# Patient Record
Sex: FEMALE | Race: WHITE | Employment: FULL TIME | URBAN - METROPOLITAN AREA
[De-identification: names, ages, dates, MRNs, and addresses within clinical notes are randomized per-mention and may not be internally consistent; named-entity substitution may affect disease eponyms.]

---

## 2017-11-07 LAB
CHLAMYDIA, EXTERNAL: NEGATIVE
HBSAG, EXTERNAL: NEGATIVE
HIV, EXTERNAL: NON REACTIVE
N. GONORRHEA, EXTERNAL: NEGATIVE
RUBELLA, EXTERNAL: NORMAL
TYPE, ABO & RH, EXTERNAL: NORMAL

## 2018-03-27 LAB
ANTIBODY SCREEN, EXTERNAL: NEGATIVE
T. PALLIDUM, EXTERNAL: NEGATIVE

## 2018-05-21 LAB — GRBS, EXTERNAL: NEGATIVE

## 2018-06-05 ENCOUNTER — ANESTHESIA EVENT (OUTPATIENT)
Dept: LABOR AND DELIVERY | Age: 33
End: 2018-06-05
Payer: COMMERCIAL

## 2018-06-05 ENCOUNTER — HOSPITAL ENCOUNTER (INPATIENT)
Age: 33
LOS: 2 days | Discharge: HOME | End: 2018-06-07
Attending: OBSTETRICS & GYNECOLOGY | Admitting: OBSTETRICS & GYNECOLOGY
Payer: COMMERCIAL

## 2018-06-05 ENCOUNTER — ANESTHESIA (OUTPATIENT)
Dept: LABOR AND DELIVERY | Age: 33
End: 2018-06-05
Payer: COMMERCIAL

## 2018-06-05 DIAGNOSIS — R52 POSTPARTUM PAIN: Primary | ICD-10-CM

## 2018-06-05 PROBLEM — Z34.90 PREGNANCY: Status: ACTIVE | Noted: 2018-06-05

## 2018-06-05 LAB
A1 MICROGLOB PLACENTAL VAG QL: POSITIVE
BASOPHILS # BLD: 0.1 K/UL (ref 0–0.1)
BASOPHILS NFR BLD: 0 % (ref 0–1)
CONTROL LINE PRESENT?: NORMAL
DAILY QC (YES/NO)?: YES
DIFFERENTIAL METHOD BLD: ABNORMAL
EOSINOPHIL # BLD: 0.1 K/UL (ref 0–0.4)
EOSINOPHIL NFR BLD: 1 % (ref 0–7)
ERYTHROCYTE [DISTWIDTH] IN BLOOD BY AUTOMATED COUNT: 12.7 % (ref 11.5–14.5)
EXPIRATION DATE: NORMAL
HCT VFR BLD AUTO: 37.3 % (ref 35–47)
HGB BLD-MCNC: 12.7 G/DL (ref 11.5–16)
IMM GRANULOCYTES # BLD: 0.1 K/UL (ref 0–0.04)
IMM GRANULOCYTES NFR BLD AUTO: 1 % (ref 0–0.5)
INTERNAL NEGATIVE CONTROL: NORMAL
KIT LOT NO.: NORMAL
LYMPHOCYTES # BLD: 3 K/UL (ref 0.8–3.5)
LYMPHOCYTES NFR BLD: 27 % (ref 12–49)
MCH RBC QN AUTO: 28.7 PG (ref 26–34)
MCHC RBC AUTO-ENTMCNC: 34 G/DL (ref 30–36.5)
MCV RBC AUTO: 84.4 FL (ref 80–99)
MONOCYTES # BLD: 0.9 K/UL (ref 0–1)
MONOCYTES NFR BLD: 8 % (ref 5–13)
NEUTS SEG # BLD: 7 K/UL (ref 1.8–8)
NEUTS SEG NFR BLD: 63 % (ref 32–75)
NRBC # BLD: 0 K/UL (ref 0–0.01)
NRBC BLD-RTO: 0 PER 100 WBC
PH, VAGINAL FLUID: 7 (ref 5–6.1)
PLATELET # BLD AUTO: 196 K/UL (ref 150–400)
PMV BLD AUTO: 10 FL (ref 8.9–12.9)
RBC # BLD AUTO: 4.42 M/UL (ref 3.8–5.2)
WBC # BLD AUTO: 11.2 K/UL (ref 3.6–11)

## 2018-06-05 PROCEDURE — 36415 COLL VENOUS BLD VENIPUNCTURE: CPT | Performed by: OBSTETRICS & GYNECOLOGY

## 2018-06-05 PROCEDURE — 74011250636 HC RX REV CODE- 250/636: Performed by: OBSTETRICS & GYNECOLOGY

## 2018-06-05 PROCEDURE — 76060000078 HC EPIDURAL ANESTHESIA

## 2018-06-05 PROCEDURE — A4300 CATH IMPL VASC ACCESS PORTAL: HCPCS

## 2018-06-05 PROCEDURE — 00HU33Z INSERTION OF INFUSION DEVICE INTO SPINAL CANAL, PERCUTANEOUS APPROACH: ICD-10-PCS | Performed by: ANESTHESIOLOGY

## 2018-06-05 PROCEDURE — 77030003666 HC NDL SPINAL BD -A

## 2018-06-05 PROCEDURE — 83986 ASSAY PH BODY FLUID NOS: CPT | Performed by: OBSTETRICS & GYNECOLOGY

## 2018-06-05 PROCEDURE — 85025 COMPLETE CBC W/AUTO DIFF WBC: CPT | Performed by: OBSTETRICS & GYNECOLOGY

## 2018-06-05 PROCEDURE — 77030034849

## 2018-06-05 PROCEDURE — 74011000250 HC RX REV CODE- 250

## 2018-06-05 PROCEDURE — 74011250636 HC RX REV CODE- 250/636

## 2018-06-05 PROCEDURE — 75810000275 HC EMERGENCY DEPT VISIT NO LEVEL OF CARE

## 2018-06-05 PROCEDURE — 99283 EMERGENCY DEPT VISIT LOW MDM: CPT

## 2018-06-05 PROCEDURE — 77030014125 HC TY EPDRL BBMI -B: Performed by: ANESTHESIOLOGY

## 2018-06-05 PROCEDURE — 75410000002 HC LABOR FEE PER 1 HR

## 2018-06-05 PROCEDURE — 65410000002 HC RM PRIVATE OB

## 2018-06-05 PROCEDURE — 84112 EVAL AMNIOTIC FLUID PROTEIN: CPT | Performed by: OBSTETRICS & GYNECOLOGY

## 2018-06-05 RX ORDER — FENTANYL/BUPIVACAINE/NS/PF 2-1250MCG
PREFILLED PUMP RESERVOIR EPIDURAL
Status: COMPLETED
Start: 2018-06-05 | End: 2018-06-05

## 2018-06-05 RX ORDER — BUPIVACAINE HYDROCHLORIDE AND EPINEPHRINE 2.5; 5 MG/ML; UG/ML
INJECTION, SOLUTION EPIDURAL; INFILTRATION; INTRACAUDAL; PERINEURAL AS NEEDED
Status: DISCONTINUED | OUTPATIENT
Start: 2018-06-05 | End: 2018-06-06 | Stop reason: HOSPADM

## 2018-06-05 RX ORDER — FENTANYL/BUPIVACAINE/NS/PF 2-1250MCG
1-16 PREFILLED PUMP RESERVOIR EPIDURAL CONTINUOUS
Status: DISCONTINUED | OUTPATIENT
Start: 2018-06-05 | End: 2018-06-06 | Stop reason: HOSPADM

## 2018-06-05 RX ORDER — CALCIUM CARBONATE 200(500)MG
2 TABLET,CHEWABLE ORAL DAILY
COMMUNITY

## 2018-06-05 RX ORDER — OXYTOCIN IN 5 % DEXTROSE 30/500 ML
PLASTIC BAG, INJECTION (ML) INTRAVENOUS
Status: DISCONTINUED
Start: 2018-06-05 | End: 2018-06-05

## 2018-06-05 RX ORDER — SODIUM CHLORIDE 0.9 % (FLUSH) 0.9 %
5-10 SYRINGE (ML) INJECTION AS NEEDED
Status: DISCONTINUED | OUTPATIENT
Start: 2018-06-05 | End: 2018-06-06 | Stop reason: HOSPADM

## 2018-06-05 RX ORDER — BUPIVACAINE HYDROCHLORIDE AND EPINEPHRINE 2.5; 5 MG/ML; UG/ML
INJECTION, SOLUTION EPIDURAL; INFILTRATION; INTRACAUDAL; PERINEURAL
Status: COMPLETED
Start: 2018-06-05 | End: 2018-06-05

## 2018-06-05 RX ORDER — OXYTOCIN IN 5 % DEXTROSE 30/500 ML
1-25 PLASTIC BAG, INJECTION (ML) INTRAVENOUS
Status: DISCONTINUED | OUTPATIENT
Start: 2018-06-05 | End: 2018-06-06 | Stop reason: HOSPADM

## 2018-06-05 RX ORDER — FENTANYL CITRATE 50 UG/ML
INJECTION, SOLUTION INTRAMUSCULAR; INTRAVENOUS AS NEEDED
Status: DISCONTINUED | OUTPATIENT
Start: 2018-06-05 | End: 2018-06-06 | Stop reason: HOSPADM

## 2018-06-05 RX ORDER — SODIUM CHLORIDE, SODIUM LACTATE, POTASSIUM CHLORIDE, CALCIUM CHLORIDE 600; 310; 30; 20 MG/100ML; MG/100ML; MG/100ML; MG/100ML
125 INJECTION, SOLUTION INTRAVENOUS CONTINUOUS
Status: DISCONTINUED | OUTPATIENT
Start: 2018-06-05 | End: 2018-06-07 | Stop reason: HOSPADM

## 2018-06-05 RX ORDER — FENTANYL CITRATE 50 UG/ML
INJECTION, SOLUTION INTRAMUSCULAR; INTRAVENOUS
Status: COMPLETED
Start: 2018-06-05 | End: 2018-06-05

## 2018-06-05 RX ORDER — SODIUM CHLORIDE 0.9 % (FLUSH) 0.9 %
5-10 SYRINGE (ML) INJECTION EVERY 8 HOURS
Status: DISCONTINUED | OUTPATIENT
Start: 2018-06-05 | End: 2018-06-06 | Stop reason: HOSPADM

## 2018-06-05 RX ADMIN — SODIUM CHLORIDE, SODIUM LACTATE, POTASSIUM CHLORIDE, AND CALCIUM CHLORIDE 125 ML/HR: 600; 310; 30; 20 INJECTION, SOLUTION INTRAVENOUS at 18:37

## 2018-06-05 RX ADMIN — Medication 12 ML/HR: at 18:25

## 2018-06-05 RX ADMIN — BUPIVACAINE HYDROCHLORIDE AND EPINEPHRINE 0.8 ML: 2.5; 5 INJECTION, SOLUTION EPIDURAL; INFILTRATION; INTRACAUDAL; PERINEURAL at 18:03

## 2018-06-05 RX ADMIN — BUPIVACAINE HYDROCHLORIDE AND EPINEPHRINE 3 ML: 2.5; 5 INJECTION, SOLUTION EPIDURAL; INFILTRATION; INTRACAUDAL; PERINEURAL at 18:04

## 2018-06-05 RX ADMIN — SODIUM CHLORIDE, SODIUM LACTATE, POTASSIUM CHLORIDE, AND CALCIUM CHLORIDE 125 ML/HR: 600; 310; 30; 20 INJECTION, SOLUTION INTRAVENOUS at 11:12

## 2018-06-05 RX ADMIN — BUPIVACAINE HYDROCHLORIDE AND EPINEPHRINE 3 ML: 2.5; 5 INJECTION, SOLUTION EPIDURAL; INFILTRATION; INTRACAUDAL; PERINEURAL at 18:06

## 2018-06-05 RX ADMIN — Medication 2 MILLI-UNITS/MIN: at 11:11

## 2018-06-05 RX ADMIN — FENTANYL CITRATE 100 MCG: 50 INJECTION, SOLUTION INTRAMUSCULAR; INTRAVENOUS at 18:06

## 2018-06-05 RX ADMIN — SODIUM CHLORIDE, SODIUM LACTATE, POTASSIUM CHLORIDE, AND CALCIUM CHLORIDE 999 ML/HR: 600; 310; 30; 20 INJECTION, SOLUTION INTRAVENOUS at 17:36

## 2018-06-05 RX ADMIN — Medication 14 MILLI-UNITS/MIN: at 15:30

## 2018-06-05 RX ADMIN — Medication 16 MILLI-UNITS/MIN: at 16:00

## 2018-06-05 NOTE — PROGRESS NOTES
0715- pt off monitor to go to walk. Birthing ball given    0805- pt back on monitor    0838- reactive nst, pt off monitor to walk    0940- pt back on monitor    0956- pt off monitior, reactive nst    1057- dr. Elizabeth Zuñiga in. Strip reviewed. sve done. poc to start pitocin at this time. Pt and  in agreement with this plan    1300- pt comfortable during contractions, no needs voiced    1600- pt uncomfortable with contractions, pt declines any pain management at this time    1640- pt requesting dr. Elizabeth Zuñiga to check cervix, dr. Elizabeth Zuñiga called and made aware, will come shortly    46- dr. Elizabeth Zuñiga in. Strip reviewed. sve done. poc to continue with pitocin    1723- pt requesting epidural, fluid bolus started    1800- dr. Skyla Montaño in. Time out done. Pt to sitting position on side of bed    1808- epidural completed. 1835- pt repositioned to far right side with peanut ball    1906- dr. Elizabeth Zuñiga in. Strip reviewed.  sve done    1911- report to [email or web address]

## 2018-06-05 NOTE — H&P
EDC:2018  EGA: 38 weeks, 2 days      Chief Complaint:  leakage of fluid. History of Present Illness:  35 y/o  @ 38w2d presents to L&D c/o leakage of fluid since . Some scant spotting. Denies contractions. Good fetal movement. Pregnancy complicated by low PRAVEENA-A (US surveillance has been reassuring). GS 36w1d: EFW 45% (6-3, 2797), HC 35w1d, AC 36w1d, posterior placenta, STEPHON 11.7      Patient's Prenatal Care with Doctor of Record Shanelle Herrera MD Notable For -    Low PRAVEENA A growth 28 (39%), growth 36 (45%)  Normal pregnancy primigravida, girl, waiting on name   lab screening          Impression & Recommendations:    Problem # 1:  ROM at term (WYA-726.63) (GUG73-T18.02)  PROM 2345  Irregular contractions  GBS negative  Admit for expectant management  Augment prn    Problem # 2:  Low PRAVEENA A growth 28 (39%), growth 36 (45%) (ICD-796.5) (DTS31-E86.1)    Other Orders:  Sent to L&D (CPT-AdmitF)      Past Pregnancy History      :  1     Term Births:  0     Premature Births: 0     Living Children: 0     Para:   0     Prev : 0     Aborta:  0     Elect. Ab:  0     Spont.  Ab:  0     Ectopics:  0    Pregnancy # 1     Comments:  current        Past Medical History:     Reviewed history from 2016 and no changes required:        Dysmenorrhea, improved with OCP    Past Surgical History:     Reviewed history from 2016 and no changes required:        None    Family History Summary:      Reviewed history Last on 2017 and no changes required:2018      General Comments - FH:  Family history transferred to [address] [place] [address] [address] [facility]     Social History:     Reviewed history from 2017 and no changes required:         to Fernandez        both work for [employer]        both went to [employer] [facility] from [facility] moved to [place] 2015        Originally from [address] [place] [address]        See HPI    Except as noted in the HPI, the review of systems is negative for General, Breast, , CV, Resp, Endo, MS, Derm, Neuro, Psych, Allergy and Heme.     Allergies      Medications Removed from Medication List        Flowsheet View for Follow-up Visit     Estimated weeks of        gestation:  38 2/7     Weight:  159.6     FHR:   120     Fetal position:  vertex     Cx Dilation:  0     Cx Effacement: 80%     Cx Station:  0      Vital Signs  Visit Vitals    /78    Pulse 80    Temp 98.1 °F (36.7 °C)    Resp 16    Ht 5' 5\" (1.651 m)    Wt 74.1 kg (163 lb 5.8 oz)    SpO2 98%    BMI 27.18 kg/m2       FHT Descrip:    reactive       - Additional FHT Comments: category 1  Contractions:  yes  UC Frequency:  Irregular    NST:   reactive     Height:   65.5 inches  Weight:  159.6 pounds    BMI:   26.25 inches    Physical Exam     General           General appearance:  no acute distress    Head           Inspection:   normal    Eyes           External:   EOM intact    ENT           Dental:   adequate dentition    Chest           Lungs:  clear to auscultation          Heart:  regular rate and rhythm    Extremeties           Extremeties:  0 edema    Neurological           Reflexes:  2+ and symmetric with no pathological reflexes    Psych           Orientation:  oriented to time, place, and person          Mood:  no appearance of anxiety, depression, or agitation    Lymph           Inguinal:  no inguinal adenopathy    Skin           Inspection:  no rashes, suspicious lesions, or ulcerations    Abdomen           Abdomen:  gravid          EFW:  6.5#    Pelvic Exam           EGBUS:  no lesions          Vagina:  pooling clear fluid - nitrazine & Amnisure positive          Uterus:  gravid          Cervix:  no lesions or discharge                  Dilation: : 0                  Effacement:  80%                  Station:  0                  Presentation:  vertex                  Membranes:  ruptured    [place]: irregular fluid  FHR: 120, category 1        Impression & Recommendations:    Problem # 1:  ROM at term ([facility]-121.04) (FLX54-J68.02)  PROM 2345  Irregular contractions  GBS negative  Admit for expectant management  Augment prn    Problem # 2:  Low PRAVEENA A growth 28 (39%), growth 36 (45%) (ICD-796.5) (HHC50-L19.1)    Other Orders:  Sent to L&D (CPT-AdmitF)      Medications (at conclusion of this visit)    01/02/2018 VITAMIN D3 1000 UNIT ORAL CAPSULE (CHOLECALCIFEROL)   11/07/2017 PNV-DHA CAPSULE (PRENAT W/O J-HA-RVMXBNE-FA-DHA CAPS)           LABORATORY DATA   TEST DATE RESULT   Group B Strep culture 05/21/2018 Negative                                   (Group B Strep Culture Result Field)   Blood Type 11/07/2017 B                                             (Blood Type Result Field)   Rh 11/07/2017 Positive                                   (Rh Result Field)   Rhogam Inj Given     Tdap Vaccine Given 03/27/2018 Vacc. [address]   Antibody Screen 03/27/2018 Negative   Rubella  [facility] Reference Ranges On or After 3/10/14                  <0.90              Non-immune      0.90 - 0.99     Equivocal      >0.99              Immune    [facility] Reference Ranges  Before 3/10/14           <5                 Non-immune             5 - 9               Equivocal            >9                 Immune  [facility] Reference Ranges       < Or = 0.90       Negative             0.91-1.09          Equivocal            > Or = 1.10       Positive   11/07/2017     24.70     TPA (T Pallidum Antibodies) 03/27/2018 Negative   Serology (RPR)     HBsAg 11/07/2017 Negative   HIV 11/07/2017 Non Reactive   Hemoglobin 03/27/2018 12.4   Hematocrit 03/27/2018 37.6   Platelets 08/78/5472 205 X10E3/UL   TSH     Urine Culture 11/07/2017 Negative   GC DNA Probe 11/07/2017 Negative   Chlamydia DNA 11/07/2017 Negative   PAP 01/18/2016 NIL   Flu Vaccine Given 11/07/2017 Vacc.  VWC   HGBA1C     HGB Electro     T4, Free     BG Fasting     GTT 1H 50G 03/27/2018 133   GTT 1H 100G GTT 2H 100G     GTT 3H 100G     Glucose Plasma     CF Accept or Decline 11/07/2017 accepted   CF Screen Result 11/07/2017 Negative   Nuchal Trans 12/05/2017 1.2^1.2 mm&millimeters   AFP Only 01/02/2018 *Screen Negative*   Tetra     AFP Serum     CVS 11/07/2017 declined   AFP Amniotic     Amnio Karyo 11/07/2017 declined   FISH     GC Culture     Chlamydia Cult     Ureaplasma     Mycoplasma     WBC 11/07/2017 7.9 X10E3/UL   RBC 11/07/2017 4.72 X10E6/UL   MCV 11/07/2017 86   MCH 11/07/2017 29.2   MCHC RBC 11/07/2017 33.9     ULTRASOUND DATA   TEST DATE RESULT   Estimated Fetal Weight 05/21/2018 8862.83985600^6345 g&grams                                     Weight % 05/21/2018 45^45% %&percent                                                STEPHON 05/21/2018 11.74^11.7 cm&centimeters                    BPP 05/21/2018 8^8 [n/a]&Not applicable   Cervical Length (mm)             Electronically signed by Nedra Mcdonough MD on 06/05/2018 at 3:22 AM    ________________________________________________________________________  Date of Surgery Update:  Bradley Kaufman was seen and examined. History and physical has been reviewed. The patient has been examined.  There have been no significant clinical changes since the completion of the originally dated History and Physical.    Signed By: Nedra Mcdonough MD     June 5, 2018 3:24 AM

## 2018-06-05 NOTE — ANESTHESIA PROCEDURE NOTES
Epidural Block    Start time: 6/5/2018 5:58 PM  End time: 6/5/2018 6:08 PM  Performed by: Esther Reis by: Laisha Cazares     Pre-Procedure  Indication: labor epidural    Preanesthetic Checklist: risks and benefits discussed, site marked and timeout performed    Timeout Time: 17:58        Epidural:   Patient position:  Seated  Prep region:  Lumbar  Prep: DuraPrep    Location:  L4-5    Needle and Epidural Catheter:   Needle Type:  Tuohy  Needle Gauge:  17 G  Injection Technique:  Loss of resistance using saline  Attempts:  1  Catheter Size:  19 G  Catheter at Skin Depth (cm):  8  Depth in Epidural Space (cm):  4  Events: no blood with aspiration, no cerebrospinal fluid with aspiration, no paresthesia and negative aspiration test    Test Dose:  Bupivacaine 0.25% w/ epi and negative    Assessment:   Catheter Secured:  Tegaderm and tape  Insertion:  Uncomplicated  Patient tolerance:  Patient tolerated the procedure well with no immediate complications  Spinal portion:    A 25 g pencil point spinal needle was placed through the Touhy x1 attempt until CSF was obtained. 0.8 mL 0.25% bupivacaine with 1:200K epinephrine was deposited into the CSF. -paresthesia.

## 2018-06-05 NOTE — IP AVS SNAPSHOT
[address] [facility] 
[phone] Patient: Ishaan Gallego MRN: [id number] SIY:[birth year] A check janine indicates which time of day the medication should be taken. My Medications START taking these medications Instructions Each Dose to Equal  
 Morning Noon Evening Bedtime  
 ibuprofen 800 mg tablet Commonly known as:  MOTRIN Your last dose was: Your next dose is: Take 1 Tab by mouth every eight (8) hours as needed for Pain. 800 mg  
    
   
   
   
  
 miscellaneous medical supply Misc Commonly known as:  BLOOD PRESSURE CUFF Your last dose was: Your next dose is:    
   
   
 1 Each by Does Not Apply route daily. 1 Each  
    
   
   
   
  
 oxyCODONE-acetaminophen 5-325 mg per tablet Commonly known as:  PERCOCET Your last dose was: Your next dose is: Take 1 Tab by mouth every four (4) hours as needed for Pain. Max Daily Amount: 6 Tabs. 1 Tab CONTINUE taking these medications Instructions Each Dose to Equal  
 Morning Noon Evening Bedtime  
 calcium carbonate 200 mg calcium (500 mg) Chew Commonly known as:  TUMS Your last dose was: Your next dose is: Take 2 Tabs by mouth daily. Indications: Heartburn 2 Tab PNV66-Iron Fumarate-FA-DSS-DHA 26-1.2- mg Cap Your last dose was: Your next dose is: Take 1 Tab by mouth. Indications: pregnancy 1 Tab Where to Get Your Medications Information on where to get these meds will be given to you by the nurse or doctor. ! Ask your nurse or doctor about these medications  
  ibuprofen 800 mg tablet  
 miscellaneous medical supply Misc  
 oxyCODONE-acetaminophen 5-325 mg per tablet

## 2018-06-05 NOTE — PROGRESS NOTES
Pt comfortable. Has been ambulating, sitting on ball, etc.  Feeling some pressure but not much in the way of contractions. Visit Vitals    /74    Pulse 69    Temp 98.3 °F (36.8 °C)    Resp 18    Ht 5' 5\" (1.651 m)    Wt 74.1 kg (163 lb 5.8 oz)    SpO2 98%    Breastfeeding Yes    BMI 27.18 kg/m2      Patient Vitals for the past 4 hrs: Mode Fetal Heart Rate Fetal Activity Variability Decelerations Accelerations RN Reviewed Strip?   06/05/18 1200 External 125 Present 6-25 BPM None Yes Yes   06/05/18 1145 External 125 - - - - -   06/05/18 1130 External 120 Present 6-25 BPM None Yes Yes   06/05/18 1113 External 130 Present 6-25 BPM None Yes Yes   06/05/18 0954 External 135 Present 6-25 BPM None Yes Yes   06/05/18 0838 External 125 Present 6-25 BPM None Yes Yes     CTx irregular, mild    Cx 1/80/0    35 yo G1 at 45 2/7 with PROM at 2345 last night. Now nearly 12 hours after rupture without significant labor. Cat 1 tracing  Discussed augmentation with pitocin, and the risks and benefits of this and she agrees. Will begin and titrate to adequacy.

## 2018-06-05 NOTE — PROGRESS NOTES
Pt feeling painful contractions for about an hour or so. Visit Vitals    /62    Pulse 76    Temp 98 °F (36.7 °C)    Resp 18    Ht 5' 5\" (1.651 m)    Wt 74.1 kg (163 lb 5.8 oz)    SpO2 100%    Breastfeeding Yes    BMI 27.18 kg/m2      Patient Vitals for the past 4 hrs: Mode Fetal Heart Rate Fetal Activity Variability Decelerations Accelerations RN Reviewed Strip?   06/05/18 1745 External 120 - - - - -   06/05/18 1727 External 135 Present 6-25 BPM None Yes Yes   06/05/18 1715 External 120 - - - - -   06/05/18 1700 External 135 Present 6-25 BPM None Yes Yes   06/05/18 1645 External 130 - - - - -   06/05/18 1630 External 125 Present 6-25 BPM None Yes Yes   06/05/18 1615 External 125 - - - - -   06/05/18 1600 External 130 Present 6-25 BPM None Yes Yes   06/05/18 1548 External 130 - - - - -   06/05/18 1528 External 125 Present 6-25 BPM None Yes Yes   06/05/18 1515 External 125 - - - - -   06/05/18 1500 External 135 Present 6-25 BPM None Yes Yes   06/05/18 1445 External 135 - - - - -   06/05/18 1433 External 130 Present 6-25 BPM None Yes Yes   06/05/18 1415 External 130 - - - - -      Ctx q 4 minutes    Cx 2/100/0    33 yo G1 at 45 2/7, AOL for PROM  Just recently beginning with painful ctx after initation of pit at aorund 11 am.    Modest cervical change since then.     Cont pit  Cat 1 tracing

## 2018-06-05 NOTE — PROGRESS NOTES
Admitted to labor and delivery with c/o SROM with cl. And bld. Tinged fluid at 2345. Intro. Done. poc explained. [place] to rm. To br to change clothes and void. 0945. Back in bed. efm explained and applied. Assess. Done. 0135. poc explained. Amnisure done. Nitrazine paper positive. 0145. Amnisure positive.    0300. Dr. Verónica Camacho at bedside. View strip. Assess. Done. sve done 0/80%/0/vtx. . L/o    oob to amb    0700. Bedside report given. oob to br to void and amb.

## 2018-06-05 NOTE — IP AVS SNAPSHOT
[facility] 
[phone] Patient: Adeline Rodriguez MRN: [id number] EOD:[birth year] About your hospitalization You were admitted on:  June 5, 2018 You last received care in the:  [facility] 3 [facility] You were discharged on:  June 7, 2018 Why you were hospitalized Your primary diagnosis was:  Not on File Your diagnoses also included:  Pregnancy Follow-up Information Follow up With Details Comments Contact Info Channing Bradley MD   [address] [facility] 
[phone] Discharge Orders None A check janine indicates which time of day the medication should be taken. My Medications START taking these medications Instructions Each Dose to Equal  
 Morning Noon Evening Bedtime  
 ibuprofen 800 mg tablet Commonly known as:  MOTRIN Your last dose was: Your next dose is: Take 1 Tab by mouth every eight (8) hours as needed for Pain. 800 mg  
    
   
   
   
  
 miscellaneous medical supply Misc Commonly known as:  BLOOD PRESSURE CUFF Your last dose was: Your next dose is:    
   
   
 1 Each by Does Not Apply route daily. 1 Each  
    
   
   
   
  
 oxyCODONE-acetaminophen 5-325 mg per tablet Commonly known as:  PERCOCET Your last dose was: Your next dose is: Take 1 Tab by mouth every four (4) hours as needed for Pain. Max Daily Amount: 6 Tabs. 1 Tab CONTINUE taking these medications Instructions Each Dose to Equal  
 Morning Noon Evening Bedtime  
 calcium carbonate 200 mg calcium (500 mg) Chew Commonly known as:  TUMS Your last dose was: Your next dose is: Take 2 Tabs by mouth daily. Indications: Heartburn 2 Tab PNV66-Iron Fumarate-FA-DSS-DHA 26-1.2- mg Cap Your last dose was: Your next dose is: Take 1 Tab by mouth. Indications: pregnancy 1 Tab Where to Get Your Medications Information on where to get these meds will be given to you by the nurse or doctor. ! Ask your nurse or doctor about these medications  
  ibuprofen 800 mg tablet  
 miscellaneous medical supply Misc  
 oxyCODONE-acetaminophen 5-325 mg per tablet Opioid Education Prescription Opioids: What You Need to Know: 
 
 
Diet/Diet Restrictions: 
· Eight 8-ounce glasses of fluid daily (water, juices); avoid excessive caffeine intake. · Meals/snacks as desired which are high in fiber and carbohydrates and low in fat and cholesterol. Physical Activity / Restrictions / Safety: · Avoid heavy lifting, no more that 8 lbs. For 2-3 weeks;  
· Limit use of stairs to 2 times daily for the first week home. · No driving for one week. · Avoid intercourse 4-6 weeks, no douching or tampon use. · Check with obstetrician before starting or resuming an exercise program.   
 
Discharge Instructions/Special Treatment/Home Care Needs:  
 
· Continue prenatal vitamins. · Continue to use squirt bottle with warm water on your episiotomy after each bathroom use until bleeding stops. · If steri-strips applied to your incision, remove in 7-10 days. Call your doctor for the following: · Fever over 101 degrees by mouth. · Vaginal bleeding heavier than a normal menstrual period or clots larger than a golf ball. · Red streaks or increased swelling of legs, painful red streaks on your breast. 
· Painful urination, constipation and increased pain or swelling or discharge with your incision. · If you feel extremely anxious or overwhelmed. · If you have thoughts of harming yourself and/or your baby. Pain Management:  
 
· Take Acetaminophen (Tylenol) or Ibuprofen (Advil, Motrin), as directed for pain. · Use a warm Sitz bath 3 times daily to relieve episiotomy or hemorrhoidal discomfort. · For hemorrhoidal discomfort, use Tucks and Anusol cream as needed and directed. · Heating pad to  incision as needed. Follow-Up Care:  
 
Appointment with MD: Follow-up Appointments Procedures  FOLLOW UP VISIT Appointment in: 6 Weeks Standing Status:   Standing Number of Occurrences:   1 Order Specific Question:   Appointment in Answer:   6 Weeks Telephone number: [phone] Signed By: Gloria Javier MD                                                                                                   Date: 2018 Time: 11:23 AM 
 
Getourguide Activation Thank you for requesting access to Getourguide. Please follow the instructions below to securely access and download your online medical record. Getourguide allows you to send messages to your doctor, view your test results, renew your prescriptions, schedule appointments, and more. How Do I Sign Up? 1. In your internet browser, go to [email or web address] 
2. Click on the First Time User? Click Here link in the Sign In box. You will be redirect to the New Member Sign Up page. 3. Enter your Getourguide Access Code exactly as it appears below. You will not need to use this code after youve completed the sign-up process. If you do not sign up before the expiration date, you must request a new code. Getourguide Access Code: [email or web address] Expires: 9/3/2018  1:10 AM (This is the date your Getourguide access code will ) 4. Enter the last four digits of your Social Security Number (xxxx) and Date of Birth (mm/dd/yyyy) as indicated and click Submit. You will be taken to the next sign-up page. 5. Create a Conyac ID. This will be your Conyac login ID and cannot be changed, so think of one that is secure and easy to remember. 6. Create a Conyac password. You can change your password at any time. 7. Enter your Password Reset Question and Answer. This can be used at a later time if you forget your password. 8. Enter your e-mail address. You will receive e-mail notification when new information is available in [address] 9. Click Sign Up. You can now view and download portions of your medical record. 10. Click the Download Summary menu link to download a portable copy of your medical information. Additional Information If you have questions, please visit the Frequently Asked Questions section of the Conyac website at [email or web address] Remember, Conyac is NOT to be used for urgent needs. For medical emergencies, dial 911. Conyac Announcement We are excited to announce that we are making your provider's discharge notes available to you in Conyac. You will see these notes when they are completed and signed by the physician that discharged you from your recent hospital stay. If you have any questions or concerns about any information you see in Conyac, please call the Health Information Department where you were seen or reach out to your Primary Care Provider for more information about your plan of care. Introducing [facility]! Vilma Staples introduces Conyac patient portal. Now you can access parts of your medical record, email your doctor's office, and request medication refills online. 1. In your internet browser, go to [email or web address] 2. Click on the First Time User? Click Here link in the Sign In box.  You will see the New Member Sign Up page. 3. Enter your [employer] Access Code exactly as it appears below. You will not need to use this code after youve completed the sign-up process. If you do not sign up before the expiration date, you must request a new code. · [employer] Access Code: [email or web address] Expires: 9/3/2018  1:10 AM 
 
4. Enter the last four digits of your Social Security Number (xxxx) and Date of Birth (mm/dd/yyyy) as indicated and click Submit. You will be taken to the next sign-up page. 5. Create a Utility and Environmental Solutionst ID. This will be your [employer] Systems login ID and cannot be changed, so think of one that is secure and easy to remember. 6. Create a [employer] Solution[employer] password. You can change your password at any time. 7. Enter your Password Reset Question and Answer. This can be used at a later time if you forget your password. 8. Enter your e-mail address. You will receive e-mail notification when new information is available in [facility] [address] [facility] [address]. 9. Click Sign Up. You can now view and download portions of your medical record. 10. Click the Download Summary menu link to download a portable copy of your medical information. If you have questions, please visit the Frequently Asked Questions section of the 170 Systems website. Remember, [employer] is NOT to be used for urgent needs. For medical emergencies, dial 911. Now available from your iPhone and Android! Introducing Bradley Cabrera As a [employer] patient, I wanted to make you aware of our electronic visit tool called Bradley Cabrera. [employer] 24/7 allows you to connect within minutes with a medical provider 24 hours a day, seven days a week via a mobile device or tablet or logging into a secure website from your computer. You can access Bradley Cabrera from anywhere in the [place].  
 
A virtual visit might be right for you when you have a simple condition and feel like you just dont want to get out of bed, or cant get away from work for an appointment, when your regular [employer] provider is not available (evenings, weekends or holidays), or when youre out of town and need minor care. Electronic visits cost only $49 and if the [employer] 24/7 provider determines a prescription is needed to treat your condition, one can be electronically transmitted to a nearby pharmacy*. Please take a moment to enroll today if you have not already done so. The enrollment process is free and takes just a few minutes. To enroll, please download the [employer] 24/7 dawson to your tablet or phone, or visit [email or web address] to enroll on your computer. And, as an [address] [place] [address] patient with a [employer] account, the results of your visits will be scanned into your electronic medical record and your primary care provider will be able to view the scanned results. We urge you to continue to see your regular [employer] provider for your ongoing medical care. And while your primary care provider may not be the one available when you seek a Bradley Verdiemkatherinefin virtual visit, the peace of mind you get from getting a real diagnosis real time can be priceless. For more information on Sage Sciencekatherinefin, view our Frequently Asked Questions (FAQs) at [email or web address]. Sincerely, 
 
Linette Ge MD 
Chief Medical Officer [address] *:  certain medications cannot be prescribed via Sage Scienceanurag Providers Seen During Your Hospitalization Provider Specialty Primary office phone July Alexander MD Obstetrics & Gynecology [phone] Your Primary Care Physician (PCP) Primary Care Physician Office Phone Office Fax Kusum Beckford [phone] You are allergic to the following No active allergies Recent Documentation Height Weight Breastfeeding? BMI OB Status Smoking Status 1.651 m 74.1 kg Yes 27.18 kg/m2 Recent pregnancy Never Smoker Emergency Contacts Name Discharge Info Relation Home Work Mobile Morteza Brown DISCHARGE CAREGIVER [3] Spouse [3] [phone] Patient Belongings The following personal items are in your possession at time of discharge: 
  Dental Appliances: None  Visual Aid: None      Home Medications: None   Jewelry: Ring, Earrings, Necklace  Clothing: At bedside, Footwear, Pants, Shirt, Socks, Undergarments    Other Valuables: Cell Phone  Personal Items Sent to Safe: None Please provide this summary of care documentation to your next provider. Signatures-by signing, you are acknowledging that this After Visit Summary has been reviewed with you and you have received a copy. Patient Signature:  ____________________________________________________________ Date:  ____________________________________________________________  
  
[facility] Provider Signature:  ____________________________________________________________ Date:  ____________________________________________________________

## 2018-06-05 NOTE — ANESTHESIA PREPROCEDURE EVALUATION
Anesthetic History   No history of anesthetic complications            Review of Systems / Medical History  Patient summary reviewed, nursing notes reviewed and pertinent labs reviewed    Pulmonary  Within defined limits                 Neuro/Psych   Within defined limits           Cardiovascular  Within defined limits                Exercise tolerance: >4 METS     GI/Hepatic/Renal  Within defined limits              Endo/Other  Within defined limits           Other Findings              Physical Exam    Airway  Mallampati: II  TM Distance: > 6 cm  Neck ROM: normal range of motion   Mouth opening: Normal     Cardiovascular  Regular rate and rhythm,  S1 and S2 normal,  no murmur, click, rub, or gallop             Dental  No notable dental hx       Pulmonary  Breath sounds clear to auscultation               Abdominal  GI exam deferred       Other Findings            Anesthetic Plan    ASA: 1  Anesthesia type: CSE            Anesthetic plan and risks discussed with: Patient

## 2018-06-05 NOTE — IP AVS SNAPSHOT
Summary of Care Report The Summary of Care report has been created to help improve care coordination. Users with access to [facility] or [facility] [address] (Web-based application) may access additional patient information including the Discharge Summary. If you are not currently a [address] user and need more information, please call the number listed below in the [place] [address] section and ask to be connected with Medical Records. Facility Information Name Address Phone [address] [address] [phone] Patient Information Patient Name Sex TIM Crowley ([id number]) Female 1985 Discharge Information Admitting Provider Service Area Unit Annita Hagen MD / [address] [facility] 3 Mother Infant / [phone] Discharge Provider Discharge Date/Time Discharge Disposition Destination (none) 2018 (Pending) AHR (none) Patient Language Language ENGLISH [13] Hospital Problems as of 2018  Reviewed: 2018  5:56 PM by Mayo Luciano MD  
  
  
  
 Class Noted - Resolved Last Modified POA Active Problems Pregnancy  2018 - Present 2018 by Annita Hagen MD Unknown Entered by Annita Hagen MD  
  
Non-Hospital Problems as of 2018  Reviewed: 2018  5:56 PM by Mayo Luciano MD  
 None You are allergic to the following No active allergies Current Discharge Medication List  
  
START taking these medications Dose & Instructions Dispensing Information Comments  
 ibuprofen 800 mg tablet Commonly known as:  MOTRIN Dose:  800 mg Take 1 Tab by mouth every eight (8) hours as needed for Pain. Quantity:  30 Tab Refills:  0  
   
 miscellaneous medical supply Misc Commonly known as:  BLOOD PRESSURE CUFF Dose:  1 Each 1 Each by Does Not Apply route daily. Quantity:  1 Each Refills:  0  
   
 oxyCODONE-acetaminophen 5-325 mg per tablet Commonly known as:  PERCOCET Dose:  1 Tab Take 1 Tab by mouth every four (4) hours as needed for Pain. Max Daily Amount: 6 Tabs. Quantity:  6 Tab Refills:  0 CONTINUE these medications which have NOT CHANGED Dose & Instructions Dispensing Information Comments  
 calcium carbonate 200 mg calcium (500 mg) Chew Commonly known as:  TUMS Dose:  2 Tab Take 2 Tabs by mouth daily. Indications: Heartburn Refills:  0 PNV66-Iron Fumarate-FA-DSS-DHA 26-1.2- mg Cap Dose:  1 Tab Take 1 Tab by mouth. Indications: pregnancy Refills:  0 Surgery Information ID Date/Time Status Primary Surgeon All Procedures Location [id number] 6/5/2018 Complete   ZZANESTHESIA [facility] - DO NOT SCHEDULE Follow-up Information Follow up With Details Comments Contact Info Sindy Toribio MD   [address] [facility]. [phone] Discharge Instructions POST DELIVERY DISCHARGE INSTRUCTIONS Name: Lee Ann Connolly YOB: 1985 Primary Diagnosis: Active Problems: 
  Pregnancy (6/5/2018) General:  
 
Diet/Diet Restrictions: 
· Eight 8-ounce glasses of fluid daily (water, juices); avoid excessive caffeine intake. · Meals/snacks as desired which are high in fiber and carbohydrates and low in fat and cholesterol. Physical Activity / Restrictions / Safety: · Avoid heavy lifting, no more that 8 lbs. For 2-3 weeks;  
· Limit use of stairs to 2 times daily for the first week home. · No driving for one week. · Avoid intercourse 4-6 weeks, no douching or tampon use. · Check with obstetrician before starting or resuming an exercise program.   
 
Discharge Instructions/Special Treatment/Home Care Needs:  
 
· Continue prenatal vitamins. · Continue to use squirt bottle with warm water on your episiotomy after each bathroom use until bleeding stops. · If steri-strips applied to your incision, remove in 7-10 days. Call your doctor for the following: · Fever over 101 degrees by mouth. · Vaginal bleeding heavier than a normal menstrual period or clots larger than a golf ball. · Red streaks or increased swelling of legs, painful red streaks on your breast. 
· Painful urination, constipation and increased pain or swelling or discharge with your incision. · If you feel extremely anxious or overwhelmed. · If you have thoughts of harming yourself and/or your baby. Pain Management:  
 
· Take Acetaminophen (Tylenol) or Ibuprofen (Advil, Motrin), as directed for pain. · Use a warm Sitz bath 3 times daily to relieve episiotomy or hemorrhoidal discomfort. · For hemorrhoidal discomfort, use Tucks and Anusol cream as needed and directed. · Heating pad to  incision as needed. Follow-Up Care:  
 
Appointment with MD: Follow-up Appointments Procedures  FOLLOW UP VISIT Appointment in: 6 Weeks Standing Status:   Standing Number of Occurrences:   1 Order Specific Question:   Appointment in Answer:   6 Weeks Telephone number: [phone] Signed By: Raeann Houston MD                                                                                                   Date: 2018 Time: 11:23 AM 
 
PriceShoppers.com Activation Thank you for requesting access to PriceShoppers.[email or web address]. Please follow the instructions below to securely access and download your online medical record. PriceShoppers.com allows you to send messages to your doctor, view your test results, renew your prescriptions, schedule appointments, and more. How Do I Sign Up? 1. In your internet browser, go to [email or web address] 
2. Click on the First Time User? Click Here link in the Sign In box. You will be redirect to the New Member Sign Up page. 3. Enter your DermaGen Access Code exactly as it appears below. You will not need to use this code after youve completed the sign-up process. If you do not sign up before the expiration date, you must request a new code. DermaGen Access Code: [email or web address] Expires: 9/3/2018  1:10 AM (This is the date your DermaGen access code will ) 4. Enter the last four digits of your Social Security Number (xxxx) and Date of Birth (mm/dd/yyyy) as indicated and click Submit. You will be taken to the next sign-up page. 5. Create a "[employer]t ID. This will be your DermaGen login ID and cannot be changed, so think of one that is secure and easy to remember. 6. Create a DermaGen password. You can change your password at any time. 7. Enter your Password Reset Question and Answer. This can be used at a later time if you forget your password. 8. Enter your e-mail address. You will receive e-mail notification when new information is available in [address] 9. Click Sign Up. You can now view and download portions of your medical record. 10. Click the Download Summary menu link to download a portable copy of your medical information. Additional Information If you have questions, please visit the Frequently Asked Questions section of the DermaGen website at [email or web address] Remember, DermaGen is NOT to be used for urgent needs. For medical emergencies, dial 911. Chart Review Routing History No Routing History on File

## 2018-06-06 PROCEDURE — 0KQM0ZZ REPAIR PERINEUM MUSCLE, OPEN APPROACH: ICD-10-PCS | Performed by: OBSTETRICS & GYNECOLOGY

## 2018-06-06 PROCEDURE — 75410000003 HC RECOV DEL/VAG/CSECN EA 0.5 HR

## 2018-06-06 PROCEDURE — 65410000002 HC RM PRIVATE OB

## 2018-06-06 PROCEDURE — 74011250637 HC RX REV CODE- 250/637: Performed by: OBSTETRICS & GYNECOLOGY

## 2018-06-06 PROCEDURE — 77030031139 HC SUT VCRL2 J&J -A

## 2018-06-06 PROCEDURE — 75410000000 HC DELIVERY VAGINAL/SINGLE

## 2018-06-06 PROCEDURE — 75410000002 HC LABOR FEE PER 1 HR

## 2018-06-06 RX ORDER — ZOLPIDEM TARTRATE 5 MG/1
5 TABLET ORAL
Status: DISCONTINUED | OUTPATIENT
Start: 2018-06-06 | End: 2018-06-07 | Stop reason: HOSPADM

## 2018-06-06 RX ORDER — HYDROCODONE BITARTRATE AND ACETAMINOPHEN 5; 325 MG/1; MG/1
1 TABLET ORAL
Status: DISCONTINUED | OUTPATIENT
Start: 2018-06-06 | End: 2018-06-07 | Stop reason: HOSPADM

## 2018-06-06 RX ORDER — NALOXONE HYDROCHLORIDE 0.4 MG/ML
0.4 INJECTION, SOLUTION INTRAMUSCULAR; INTRAVENOUS; SUBCUTANEOUS AS NEEDED
Status: DISCONTINUED | OUTPATIENT
Start: 2018-06-06 | End: 2018-06-07 | Stop reason: HOSPADM

## 2018-06-06 RX ORDER — HYDROMORPHONE HYDROCHLORIDE 2 MG/1
2 TABLET ORAL
Status: DISCONTINUED | OUTPATIENT
Start: 2018-06-06 | End: 2018-06-07 | Stop reason: HOSPADM

## 2018-06-06 RX ORDER — OXYTOCIN/RINGER'S LACTATE 20/1000 ML
125-500 PLASTIC BAG, INJECTION (ML) INTRAVENOUS ONCE
Status: ACTIVE | OUTPATIENT
Start: 2018-06-06 | End: 2018-06-06

## 2018-06-06 RX ORDER — HYDROCORTISONE ACETATE PRAMOXINE HCL 2.5; 1 G/100G; G/100G
CREAM TOPICAL AS NEEDED
Status: DISCONTINUED | OUTPATIENT
Start: 2018-06-06 | End: 2018-06-07 | Stop reason: HOSPADM

## 2018-06-06 RX ORDER — ACETAMINOPHEN 325 MG/1
650 TABLET ORAL
Status: DISCONTINUED | OUTPATIENT
Start: 2018-06-06 | End: 2018-06-07 | Stop reason: HOSPADM

## 2018-06-06 RX ORDER — SWAB
1 SWAB, NON-MEDICATED MISCELLANEOUS DAILY
Status: DISCONTINUED | OUTPATIENT
Start: 2018-06-06 | End: 2018-06-07 | Stop reason: HOSPADM

## 2018-06-06 RX ORDER — IBUPROFEN 400 MG/1
800 TABLET ORAL EVERY 8 HOURS
Status: DISCONTINUED | OUTPATIENT
Start: 2018-06-06 | End: 2018-06-07 | Stop reason: HOSPADM

## 2018-06-06 RX ADMIN — IBUPROFEN 800 MG: 400 TABLET ORAL at 17:07

## 2018-06-06 RX ADMIN — Medication 1 TABLET: at 09:36

## 2018-06-06 RX ADMIN — IBUPROFEN 800 MG: 400 TABLET ORAL at 09:19

## 2018-06-06 NOTE — PROGRESS NOTES
Assumed care. Intro. Done. poc explained. Resting quietly in bed. [place] in [address] [facility]. Dr. Paula Marley at bedside. Assess. Done. poc explained. No new order at this time. 2105. Dr. Paula Marley here. View strip. L/o to recheck pt at 0100 except if c/o rectal or perineal pressure.

## 2018-06-06 NOTE — PROGRESS NOTES
TRANSFER - IN REPORT:    Verbal report received from Darek King RN(name) on Lee Ann Connolly  being received from L&D(unit) for routine progression of care      Report consisted of patients Situation, Background, Assessment and   Recommendations(SBAR). Information from the following report(s) SBAR, Procedure Summary, Intake/Output, MAR and Recent Results was reviewed with the receiving nurse. Opportunity for questions and clarification was provided. Assessment completed upon patients arrival to unit and care assumed.

## 2018-06-06 NOTE — ROUTINE PROCESS
Bedside shift change report given to KEN Andujar, RN (oncoming nurse) by MONIKA Felipe RNC (offgoing nurse). Report included the following information SBAR, Procedure Summary, Intake/Output, MAR and Recent Results.

## 2018-06-06 NOTE — L&D DELIVERY NOTE
Patient: Lasha Davis  MRN: [id number]   Delivery Note    Obstetrician:  Lewis Butt MD    Pre-Delivery Diagnosis:   PROM at 38 weeks    Delivery: Patient admitted status post PROM of clear fluid. Progressed under pitocin augmentation and epidural analgesia to C/C/+2 and pushed for approximately 15 min with  over second degree laceration of liveborn female infant, apgars 9/9, weight 3265gm. Head delivered direct OA. Anterior (left) shoulder delivered spontaneously with posterior shoulder and body following easily. Spontaneous cry without stimulation. Infant placed on maternal abdomen. Delayed cord clamping x 1 min. Cord clamped x 2 and cut by FOB. Placenta delivered spontaneously intact with 3V cord. 2nd degree lac repaired in standard fashion with good reapproximation and hemostasis noted. No other vaginal or cervical lacerations noted. Fundus firm at U-1.  ml. Mother and baby doing well bonding in LDR. Amniotic Fluid Volume : Moderate clear     Delivery Complications:  None    Infant Details:  Information for the patient's :  Cori Hargrove [[id number]]   One Minute Apgar: 9 (Filed from Delivery Summary)  Five  Minute Apgar: 9 (Filed from Delivery Summary)  Weight 3265 gm    Placenta:  Normal, intact, 3V, discarded        Episiotomy/Laceration(s):  Second degree          Episiotomy/Laceration(s) Repair: Yes, repaired with 2-0 Vicryl.     Group Beta Strep:   Lab Results   Component Value Date/Time    GrBStrep, External Negative 2018               Signed By:  Lewis Butt MD     2018

## 2018-06-06 NOTE — LACTATION NOTE
This note was copied from a baby's chart. Ms Murray Thompson is seen today for lactation consultation. P1 eager to learn mother enjoying her . 10 hours of life/breast fed x 3  One stool, due to void. Reviewed basics of breastfeeding through the first week of life. Assisted baby in cross cradle hold/active drinking x 10 minutes during visit. Questions answered. Has her PIS advanced pump for prn uses/reviewed. Breasts may become engorged when milk \"comes in\". How milk is made / normal phases of milk production, supply and demand discussed. Taught care of engorged breasts - frequent breastfeeding encouraged, warm compresses and breast massage ac. Then nurse the baby or pump. Apply cold compresses pc x 15 minutes a few times a day for swelling or discomfort. May need to do this care for a couple of days.   Outpatient pediatrics with PAR/INC NNP and IBCLC's  Expect success.

## 2018-06-07 VITALS
WEIGHT: 163.36 LBS | BODY MASS INDEX: 27.22 KG/M2 | RESPIRATION RATE: 16 BRPM | DIASTOLIC BLOOD PRESSURE: 83 MMHG | OXYGEN SATURATION: 99 % | TEMPERATURE: 97.8 F | HEIGHT: 65 IN | HEART RATE: 70 BPM | SYSTOLIC BLOOD PRESSURE: 123 MMHG

## 2018-06-07 PROCEDURE — 74011250637 HC RX REV CODE- 250/637: Performed by: OBSTETRICS & GYNECOLOGY

## 2018-06-07 RX ORDER — IBUPROFEN 800 MG/1
800 TABLET ORAL
Qty: 30 TAB | Refills: 0 | Status: SHIPPED | OUTPATIENT
Start: 2018-06-07 | End: 2020-05-25

## 2018-06-07 RX ORDER — OXYCODONE AND ACETAMINOPHEN 5; 325 MG/1; MG/1
1 TABLET ORAL
Qty: 6 TAB | Refills: 0 | Status: SHIPPED | OUTPATIENT
Start: 2018-06-07 | End: 2020-05-25

## 2018-06-07 RX ADMIN — IBUPROFEN 800 MG: 400 TABLET ORAL at 00:39

## 2018-06-07 RX ADMIN — IBUPROFEN 800 MG: 400 TABLET ORAL at 09:18

## 2018-06-07 NOTE — PROGRESS NOTES
Post-Partum Day Number 1 Progress Note    Vicente Settler     Assessment: Doing well, post partum day 1  Mild range bp this am, repeat 130/95. Asx. Plan to check bps at home, one week bp check. Preeclampsia signs/sxs reviewed. Plan:  1. Continue routine postpartum and perineal care as well as maternal education. Information for the patient's :  Dhara Gorman [[id number]]   Vaginal, Spontaneous Delivery   Patient doing well without significant complaint. Voiding without difficulty, normal lochia. Vitals:  Visit Vitals    /90 (BP 1 Location: Right arm, BP Patient Position: At rest)    Pulse 83    Temp 97.8 °F (36.6 °C)    Resp 16    Ht 5' 5\" (1.651 m)    Wt 74.1 kg (163 lb 5.8 oz)    SpO2 99%    Breastfeeding Yes    BMI 27.18 kg/m2     Temp (24hrs), Av.9 °F (36.6 °C), Min:97.8 °F (36.6 °C), Max:98.1 °F (36.7 °C)        Exam:   Patient without distress. Abdomen soft, fundus firm, nontender                Perineum with normal lochia noted. Lower extremities are negative for swelling, cords or tenderness. Labs:     Lab Results   Component Value Date/Time    WBC 11.2 (H) 2018 02:31 AM    HGB 12.7 2018 02:31 AM    HCT 37.3 2018 02:31 AM    PLATELET 105  02:31 AM       No results found for this or any previous visit (from the past 24 hour(s)).

## 2018-06-07 NOTE — DISCHARGE INSTRUCTIONS
POST DELIVERY DISCHARGE INSTRUCTIONS    Name: Blanca Segal  YOB: 1985  Primary Diagnosis: Active Problems:    Pregnancy (2018)        General:     Diet/Diet Restrictions:  · Eight 8-ounce glasses of fluid daily (water, juices); avoid excessive caffeine intake. · Meals/snacks as desired which are high in fiber and carbohydrates and low in fat and cholesterol. Physical Activity / Restrictions / Safety:     · Avoid heavy lifting, no more that 8 lbs. For 2-3 weeks;   · Limit use of stairs to 2 times daily for the first week home. · No driving for one week. · Avoid intercourse 4-6 weeks, no douching or tampon use. · Check with obstetrician before starting or resuming an exercise program.      Discharge Instructions/Special Treatment/Home Care Needs:     · Continue prenatal vitamins. · Continue to use squirt bottle with warm water on your episiotomy after each bathroom use until bleeding stops. · If steri-strips applied to your incision, remove in 7-10 days. Call your doctor for the following:     · Fever over 101 degrees by mouth. · Vaginal bleeding heavier than a normal menstrual period or clots larger than a golf ball. · Red streaks or increased swelling of legs, painful red streaks on your breast.  · Painful urination, constipation and increased pain or swelling or discharge with your incision. · If you feel extremely anxious or overwhelmed. · If you have thoughts of harming yourself and/or your baby. Pain Management:     · Take Acetaminophen (Tylenol) or Ibuprofen (Advil, Motrin), as directed for pain. · Use a warm Sitz bath 3 times daily to relieve episiotomy or hemorrhoidal discomfort. · For hemorrhoidal discomfort, use Tucks and Anusol cream as needed and directed. · Heating pad to  incision as needed.      Follow-Up Care:     Appointment with MD:   Follow-up Appointments   Procedures    FOLLOW UP VISIT Appointment in: 6 Weeks     Standing Status: Standing     Number of Occurrences:   1     Order Specific Question:   Appointment in     Answer:   6 Weeks     Telephone number: [phone]    Signed By: Alyssa Chen MD                                                                                                   Date: 2018 Time: 11:23 AM    MyChart Activation    Thank you for requesting access to [employer]. Please follow the instructions below to securely access and download your online medical record. [employer] allows you to send messages to your doctor, view your test results, renew your prescriptions, schedule appointments, and more. How Do I Sign Up? 1. In your internet browser, go to [email or web address]  2. Click on the First Time User? Click Here link in the Sign In box. You will be redirect to the New Member Sign Up page. 3. Enter your [employer] Access Code exactly as it appears below. You will not need to use this code after youve completed the sign-up process. If you do not sign up before the expiration date, you must request a new code. [employer] Access Code: [email or web address]  Expires: 9/3/2018  1:10 AM (This is the date your [employer] access code will )    4. Enter the last four digits of your Social Security Number (xxxx) and Date of Birth (mm/dd/yyyy) as indicated and click Submit. You will be taken to the next sign-up page. 5. Create a [employer] ID. This will be your [employer] login ID and cannot be changed, so think of one that is secure and easy to remember. 6. Create a [employer] password. You can change your password at any time. 7. Enter your Password Reset Question and Answer. This can be used at a later time if you forget your password. 8. Enter your e-mail address. You will receive e-mail notification when new information is available in [address] 9. Click Sign Up. You can now view and download portions of your medical record.   10. Click the Download Summary menu link to download a portable copy of your medical information. Additional Information    If you have questions, please visit the Frequently Asked Questions section of the [employer] website at [email or web address] Remember, [employer] is NOT to be used for urgent needs. For medical emergencies, dial 911.

## 2018-06-07 NOTE — LACTATION NOTE
This note was copied from a baby's chart. 31 hours of life  Am wt assessed at -5.6%  11 baby led breastfeeding sessions with latch of 9-8 observed and recorded. Nipples intact/lanolin used. 2 wet 4 stools. Reviewed daily I/0 expectations/feeding variances in newborns. Rest/hydrate and nutrition for mother. Enjoying . Expect success.  [facility] # provided. Call prn.

## 2018-06-07 NOTE — DISCHARGE SUMMARY
Obstetrical Discharge Summary     Name: Irais Booth MRN: [id number]  SSN: [id number]    YOB: 1985  Age: 34 y.o. Sex: female      Admit Date: 2018    Discharge Date: 2018     Admitting Physician: Grant Donaldson MD     Attending Physician:  Jacinto Rain MD     Admission Diagnoses: Pregnancy  Pregnancy    Procedure Performed:      Discharge Diagnoses:   Information for the patient's :  Niles Cartwright [[id number]]   Delivery of a 3.265 kg female infant via Vaginal, Spontaneous Delivery on 2018 at 12:58 AM  by . Apgars were 9 and 9. Additional Diagnoses:   Hospital Problems  Date Reviewed: 2018          Codes Class Noted POA    Pregnancy ICD-10-CM: Z34.90  ICD-9-CM: V22.2  2018 Unknown             Lab Results   Component Value Date/Time    Rubella, External Immune 2017    GrBStrep, External Negative 2018       Hospital Course: Presented at 38+weeks with SROM. Closed cervix. Pitocin augmentation started after about 12 hours of no labor. >24 hours of rupture went on to , 2nd degree lac. Post partum course with mild range bp on PPD1. dc'ed to home with plan for one week bp check. Patient Disposition: Home      Followup Care:  Discharge Condition: good  No sex for 6 weeks and No driving while on analgesics  Regular Diet  Keep wound clean and dry    Patient Instructions:   Current Discharge Medication List      START taking these medications    Details   ibuprofen (MOTRIN) 800 mg tablet Take 1 Tab by mouth every eight (8) hours as needed for Pain. Qty: 30 Tab, Refills: 0      oxyCODONE-acetaminophen (PERCOCET) 5-325 mg per tablet Take 1 Tab by mouth every four (4) hours as needed for Pain. Max Daily Amount: 6 Tabs. Qty: 6 Tab, Refills: 0    Associated Diagnoses: Postpartum pain      miscellaneous medical supply (BLOOD PRESSURE CUFF) misc 1 Each by Does Not Apply route daily.   Qty: 1 Each, Refills: 0         CONTINUE these medications which have NOT CHANGED    Details   PNV66-Iron Fumarate-FA-DSS-DHA 26-1.2- mg cap Take 1 Tab by mouth. Indications: pregnancy      calcium carbonate (TUMS) 200 mg calcium (500 mg) chew Take 2 Tabs by mouth daily. Indications: Heartburn             Reference my discharge instructions.     Follow-up Appointments   Procedures    FOLLOW UP VISIT Appointment in: 6 Weeks     Standing Status:   Standing     Number of Occurrences:   1     Order Specific Question:   Appointment in     Answer:   6 Weeks        Signed By:  Gloria Javier MD     June 7, 2018

## 2018-06-07 NOTE — PROGRESS NOTES
Bedside and Verbal shift change report given to Clark Fuller RN (oncoming nurse) by SANKET Buckner (offgoing nurse). Report included the following information SBAR, Kardex, OR Summary, Procedure Summary, Intake/Output, MAR and Recent Results.

## 2019-10-18 LAB
ANTIBODY SCREEN, EXTERNAL: NEGATIVE
CHLAMYDIA, EXTERNAL: NEGATIVE
HBSAG, EXTERNAL: NEGATIVE
HIV, EXTERNAL: NORMAL
N. GONORRHEA, EXTERNAL: NEGATIVE
RUBELLA, EXTERNAL: NORMAL
T. PALLIDUM, EXTERNAL: NEGATIVE
TYPE, ABO & RH, EXTERNAL: NORMAL

## 2020-05-05 LAB — GRBS, EXTERNAL: NEGATIVE

## 2020-05-20 ENCOUNTER — HOSPITAL ENCOUNTER (OUTPATIENT)
Dept: LAB | Age: 35
Discharge: HOME | End: 2020-05-20
Payer: COMMERCIAL

## 2020-05-20 DIAGNOSIS — U07.1 COVID-19: ICD-10-CM

## 2020-05-20 PROCEDURE — 87635 SARS-COV-2 COVID-19 AMP PRB: CPT

## 2020-05-21 LAB — SARS-COV-2, COV2NT: NOT DETECTED

## 2020-05-24 ENCOUNTER — ANESTHESIA EVENT (OUTPATIENT)
Dept: LABOR AND DELIVERY | Age: 35
End: 2020-05-24
Payer: COMMERCIAL

## 2020-05-24 ENCOUNTER — HOSPITAL ENCOUNTER (INPATIENT)
Age: 35
LOS: 1 days | Discharge: HOME | End: 2020-05-25
Attending: OBSTETRICS & GYNECOLOGY | Admitting: OBSTETRICS & GYNECOLOGY
Payer: COMMERCIAL

## 2020-05-24 ENCOUNTER — ANESTHESIA (OUTPATIENT)
Dept: LABOR AND DELIVERY | Age: 35
End: 2020-05-24
Payer: COMMERCIAL

## 2020-05-24 PROBLEM — O47.9 IRREGULAR LABOR: Status: ACTIVE | Noted: 2020-05-24

## 2020-05-24 LAB
ABO + RH BLD: NORMAL
BASOPHILS # BLD: 0.1 K/UL (ref 0–0.1)
BASOPHILS NFR BLD: 0 % (ref 0–1)
BLOOD GROUP ANTIBODIES SERPL: NORMAL
DIFFERENTIAL METHOD BLD: ABNORMAL
EOSINOPHIL # BLD: 0.1 K/UL (ref 0–0.4)
EOSINOPHIL NFR BLD: 1 % (ref 0–7)
ERYTHROCYTE [DISTWIDTH] IN BLOOD BY AUTOMATED COUNT: 13.2 % (ref 11.5–14.5)
HCT VFR BLD AUTO: 39 % (ref 35–47)
HGB BLD-MCNC: 13.1 G/DL (ref 11.5–16)
IMM GRANULOCYTES # BLD AUTO: 0.2 K/UL (ref 0–0.04)
IMM GRANULOCYTES NFR BLD AUTO: 1 % (ref 0–0.5)
LYMPHOCYTES # BLD: 3.3 K/UL (ref 0.8–3.5)
LYMPHOCYTES NFR BLD: 28 % (ref 12–49)
MCH RBC QN AUTO: 28.4 PG (ref 26–34)
MCHC RBC AUTO-ENTMCNC: 33.6 G/DL (ref 30–36.5)
MCV RBC AUTO: 84.4 FL (ref 80–99)
MONOCYTES # BLD: 0.8 K/UL (ref 0–1)
MONOCYTES NFR BLD: 7 % (ref 5–13)
NEUTS SEG # BLD: 7.2 K/UL (ref 1.8–8)
NEUTS SEG NFR BLD: 63 % (ref 32–75)
NRBC # BLD: 0 K/UL (ref 0–0.01)
NRBC BLD-RTO: 0 PER 100 WBC
PLATELET # BLD AUTO: 200 K/UL (ref 150–400)
PMV BLD AUTO: 9.8 FL (ref 8.9–12.9)
RBC # BLD AUTO: 4.62 M/UL (ref 3.8–5.2)
SPECIMEN EXP DATE BLD: NORMAL
WBC # BLD AUTO: 11.6 K/UL (ref 3.6–11)

## 2020-05-24 PROCEDURE — 74011000250 HC RX REV CODE- 250: Performed by: ANESTHESIOLOGY

## 2020-05-24 PROCEDURE — 0KQM0ZZ REPAIR PERINEUM MUSCLE, OPEN APPROACH: ICD-10-PCS | Performed by: OBSTETRICS & GYNECOLOGY

## 2020-05-24 PROCEDURE — 65410000002 HC RM PRIVATE OB

## 2020-05-24 PROCEDURE — 74011250636 HC RX REV CODE- 250/636

## 2020-05-24 PROCEDURE — 76815 OB US LIMITED FETUS(S): CPT

## 2020-05-24 PROCEDURE — 75410000002 HC LABOR FEE PER 1 HR

## 2020-05-24 PROCEDURE — 74011250637 HC RX REV CODE- 250/637: Performed by: OBSTETRICS & GYNECOLOGY

## 2020-05-24 PROCEDURE — 86900 BLOOD TYPING SEROLOGIC ABO: CPT

## 2020-05-24 PROCEDURE — 76060000078 HC EPIDURAL ANESTHESIA

## 2020-05-24 PROCEDURE — 75410000000 HC DELIVERY VAGINAL/SINGLE

## 2020-05-24 PROCEDURE — 74011250636 HC RX REV CODE- 250/636: Performed by: OBSTETRICS & GYNECOLOGY

## 2020-05-24 PROCEDURE — 85025 COMPLETE CBC W/AUTO DIFF WBC: CPT

## 2020-05-24 PROCEDURE — 74011000250 HC RX REV CODE- 250

## 2020-05-24 PROCEDURE — 36415 COLL VENOUS BLD VENIPUNCTURE: CPT

## 2020-05-24 PROCEDURE — 77030003666 HC NDL SPINAL BD -A: Performed by: ANESTHESIOLOGY

## 2020-05-24 PROCEDURE — 77030014125 HC TY EPDRL BBMI -B: Performed by: ANESTHESIOLOGY

## 2020-05-24 PROCEDURE — 75410000003 HC RECOV DEL/VAG/CSECN EA 0.5 HR

## 2020-05-24 RX ORDER — BUPIVACAINE HYDROCHLORIDE 2.5 MG/ML
INJECTION, SOLUTION EPIDURAL; INFILTRATION; INTRACAUDAL AS NEEDED
Status: DISCONTINUED | OUTPATIENT
Start: 2020-05-24 | End: 2020-05-24 | Stop reason: HOSPADM

## 2020-05-24 RX ORDER — DOCUSATE SODIUM 100 MG/1
100 CAPSULE, LIQUID FILLED ORAL
Status: DISCONTINUED | OUTPATIENT
Start: 2020-05-24 | End: 2020-05-25 | Stop reason: HOSPADM

## 2020-05-24 RX ORDER — BUPIVACAINE HYDROCHLORIDE AND EPINEPHRINE 2.5; 5 MG/ML; UG/ML
INJECTION, SOLUTION EPIDURAL; INFILTRATION; INTRACAUDAL; PERINEURAL
Status: COMPLETED
Start: 2020-05-24 | End: 2020-05-24

## 2020-05-24 RX ORDER — FENTANYL CITRATE 50 UG/ML
100 INJECTION, SOLUTION INTRAMUSCULAR; INTRAVENOUS AS NEEDED
Status: DISCONTINUED | OUTPATIENT
Start: 2020-05-24 | End: 2020-05-24

## 2020-05-24 RX ORDER — DIPHENHYDRAMINE HCL 25 MG
25 CAPSULE ORAL
Status: DISCONTINUED | OUTPATIENT
Start: 2020-05-24 | End: 2020-05-25 | Stop reason: HOSPADM

## 2020-05-24 RX ORDER — ACETAMINOPHEN 325 MG/1
650 TABLET ORAL
Status: DISCONTINUED | OUTPATIENT
Start: 2020-05-24 | End: 2020-05-25 | Stop reason: HOSPADM

## 2020-05-24 RX ORDER — OXYTOCIN/0.9 % SODIUM CHLORIDE 30/500 ML
PLASTIC BAG, INJECTION (ML) INTRAVENOUS
Status: COMPLETED
Start: 2020-05-24 | End: 2020-05-24

## 2020-05-24 RX ORDER — FENTANYL/BUPIVACAINE/NS/PF 2-1250MCG
PREFILLED PUMP RESERVOIR EPIDURAL
Status: COMPLETED
Start: 2020-05-24 | End: 2020-05-24

## 2020-05-24 RX ORDER — HYDROCORTISONE ACETATE PRAMOXINE HCL 2.5; 1 G/100G; G/100G
CREAM TOPICAL AS NEEDED
Status: DISCONTINUED | OUTPATIENT
Start: 2020-05-24 | End: 2020-05-25 | Stop reason: HOSPADM

## 2020-05-24 RX ORDER — OXYTOCIN/0.9 % SODIUM CHLORIDE 30/500 ML
500 PLASTIC BAG, INJECTION (ML) INTRAVENOUS ONCE
Status: COMPLETED | OUTPATIENT
Start: 2020-05-24 | End: 2020-05-24

## 2020-05-24 RX ORDER — ZOLPIDEM TARTRATE 5 MG/1
5 TABLET ORAL
Status: DISCONTINUED | OUTPATIENT
Start: 2020-05-24 | End: 2020-05-25 | Stop reason: HOSPADM

## 2020-05-24 RX ORDER — EPHEDRINE SULFATE/0.9% NACL/PF 50 MG/5 ML
10 SYRINGE (ML) INTRAVENOUS
Status: DISCONTINUED | OUTPATIENT
Start: 2020-05-24 | End: 2020-05-24

## 2020-05-24 RX ORDER — ONDANSETRON 2 MG/ML
4 INJECTION INTRAMUSCULAR; INTRAVENOUS
Status: DISCONTINUED | OUTPATIENT
Start: 2020-05-24 | End: 2020-05-25 | Stop reason: HOSPADM

## 2020-05-24 RX ORDER — NALOXONE HYDROCHLORIDE 0.4 MG/ML
0.4 INJECTION, SOLUTION INTRAMUSCULAR; INTRAVENOUS; SUBCUTANEOUS AS NEEDED
Status: DISCONTINUED | OUTPATIENT
Start: 2020-05-24 | End: 2020-05-25 | Stop reason: HOSPADM

## 2020-05-24 RX ORDER — IBUPROFEN 400 MG/1
800 TABLET ORAL
Status: DISCONTINUED | OUTPATIENT
Start: 2020-05-24 | End: 2020-05-25 | Stop reason: HOSPADM

## 2020-05-24 RX ORDER — BUPIVACAINE HYDROCHLORIDE AND EPINEPHRINE 2.5; 5 MG/ML; UG/ML
INJECTION, SOLUTION EPIDURAL; INFILTRATION; INTRACAUDAL; PERINEURAL AS NEEDED
Status: DISCONTINUED | OUTPATIENT
Start: 2020-05-24 | End: 2020-05-24 | Stop reason: HOSPADM

## 2020-05-24 RX ORDER — SODIUM CHLORIDE 0.9 % (FLUSH) 0.9 %
5-40 SYRINGE (ML) INJECTION EVERY 8 HOURS
Status: DISCONTINUED | OUTPATIENT
Start: 2020-05-24 | End: 2020-05-24

## 2020-05-24 RX ORDER — FENTANYL/BUPIVACAINE/NS/PF 2-1250MCG
1-18 PREFILLED PUMP RESERVOIR EPIDURAL CONTINUOUS
Status: DISCONTINUED | OUTPATIENT
Start: 2020-05-24 | End: 2020-05-24

## 2020-05-24 RX ORDER — SODIUM CHLORIDE 0.9 % (FLUSH) 0.9 %
5-40 SYRINGE (ML) INJECTION AS NEEDED
Status: DISCONTINUED | OUTPATIENT
Start: 2020-05-24 | End: 2020-05-24

## 2020-05-24 RX ORDER — NALOXONE HYDROCHLORIDE 0.4 MG/ML
0.4 INJECTION, SOLUTION INTRAMUSCULAR; INTRAVENOUS; SUBCUTANEOUS AS NEEDED
Status: DISCONTINUED | OUTPATIENT
Start: 2020-05-24 | End: 2020-05-24

## 2020-05-24 RX ORDER — OXYTOCIN/RINGER'S LACTATE 20/1000 ML
125-500 PLASTIC BAG, INJECTION (ML) INTRAVENOUS ONCE
Status: COMPLETED | OUTPATIENT
Start: 2020-05-24 | End: 2020-05-24

## 2020-05-24 RX ORDER — SODIUM CHLORIDE, SODIUM LACTATE, POTASSIUM CHLORIDE, CALCIUM CHLORIDE 600; 310; 30; 20 MG/100ML; MG/100ML; MG/100ML; MG/100ML
125 INJECTION, SOLUTION INTRAVENOUS CONTINUOUS
Status: DISCONTINUED | OUTPATIENT
Start: 2020-05-24 | End: 2020-05-24

## 2020-05-24 RX ADMIN — Medication 12 ML/HR: at 07:34

## 2020-05-24 RX ADMIN — SODIUM CHLORIDE, SODIUM LACTATE, POTASSIUM CHLORIDE, AND CALCIUM CHLORIDE 999 ML/HR: 600; 310; 30; 20 INJECTION, SOLUTION INTRAVENOUS at 03:05

## 2020-05-24 RX ADMIN — Medication 30000 MILLI-UNITS: at 10:32

## 2020-05-24 RX ADMIN — SODIUM CHLORIDE, SODIUM LACTATE, POTASSIUM CHLORIDE, AND CALCIUM CHLORIDE 999 ML/HR: 600; 310; 30; 20 INJECTION, SOLUTION INTRAVENOUS at 06:49

## 2020-05-24 RX ADMIN — BUPIVACAINE HYDROCHLORIDE AND EPINEPHRINE 0.8 ML: 2.5; 5 INJECTION, SOLUTION EPIDURAL; INFILTRATION; INTRACAUDAL; PERINEURAL at 07:25

## 2020-05-24 RX ADMIN — BUPIVACAINE HYDROCHLORIDE 5 ML: 2.5 INJECTION, SOLUTION EPIDURAL; INFILTRATION; INTRACAUDAL; PERINEURAL at 07:27

## 2020-05-24 RX ADMIN — OXYTOCIN 10000 MILLI-UNITS/HR: 10 INJECTION INTRAVENOUS at 11:32

## 2020-05-24 RX ADMIN — IBUPROFEN 800 MG: 400 TABLET, FILM COATED ORAL at 21:19

## 2020-05-24 RX ADMIN — SODIUM CHLORIDE, SODIUM LACTATE, POTASSIUM CHLORIDE, AND CALCIUM CHLORIDE 125 ML/HR: 600; 310; 30; 20 INJECTION, SOLUTION INTRAVENOUS at 07:13

## 2020-05-24 NOTE — PROCEDURES
Delivery Note    Obstetrician:  Salomon Rodríguez DO    Assistant: none    Pre-Delivery Diagnosis: Term pregnancy    Post-Delivery Diagnosis: Living  male infant    Intrapartum Event: None    Procedure: Spontaneous vaginal delivery    Epidural: YES    Monitor:  Fetal Heart Tones - External and Uterine Contractions - External    Indications for instrumental delivery: none    Estimated Blood Loss: 200cc    Episiotomy: none    Laceration(s):  2nd degree    Laceration(s) repair: YES with 3-0 and 2-0 vicryl    Presentation: Cephalic    Fetal Description: donis    Fetal Position: Occiput Anterior    Birth Weight: 8lbs 4oz    Birth Length: 20 3/4 inches    Apgar - One Minute: 8    Apgar - Five Minutes: 9    Umbilical Cord: 3 vessels present  Specimens: none  Complications:  none           Cord Blood Results:   Information for the patient's :  Elke Leija [[id number]]   No results found for: PCTABR, PCTDIG, BILI, ABORH    Prenatal Labs:     Lab Results   Component Value Date/Time    ABO/Rh(D) B POSITIVE 2020 02:42 AM    HBsAg, External Negative 10/18/2019    HIV, External Non-reactive 10/18/2019    Rubella, External Immune 10/18/2019    Gonorrhea, External Negative 10/18/2019    Chlamydia, External Negative 10/18/2019    GrBStrep, External Negative 2020        Attending Attestation: I performed the procedure    Patient is a 34yo G2 now P2 who presented early this morning in labor. Contractions started around . She was 2-3cm on arrival.  She progressed on her own without pitocin and had SROM this morning at 0653. She then received her epidural.  She progressed to fully dilated at 1005. She pushed approximately 15 minutes.  over intact perineum of viable male infant at 0, OA; right shoulder delivered anterior without difficulty as did the rest of the body. Baby was placed on mom's abdomen and delayed cord clamping was done.   After >1minute, the cord was clamped and cut by FOB. Placenta delivered spontaneous and intact and pitocin was started. After delivery there was a second degree laceration which was noted and repaired with 3-0 and 2-0 vicryl in a routine fashion. Rectal exam at the end of the repair was intact. Mom and baby doing well.

## 2020-05-24 NOTE — PROGRESS NOTES
Pt comfortable after epidural. Cat 1 tracing. Lit Q 2-4 min. SROM 1 hr ago-->clear.  Cx 5-6/100/0st.

## 2020-05-24 NOTE — ANESTHESIA PROCEDURE NOTES
Epidural Block    Start time: 5/24/2020 7:18 AM  End time: 5/24/2020 7:28 AM  Performed by: Mimi Tapia MD  Authorized by: Mimi Tapia MD     Pre-Procedure  Indication: at surgeon's request and labor epidural    Preanesthetic Checklist: patient identified, risks and benefits discussed, anesthesia consent, site marked, patient being monitored, timeout performed and anesthesia consent      Epidural:   Patient position:  Seated  Prep region:  Lumbar  Prep: Patient draped and DuraPrep    Location:  L2-3    Needle and Epidural Catheter:   Needle Type:  Tuohy  Needle Gauge:  17 G  Injection Technique:  Loss of resistance using saline  Attempts:  1  Catheter Size:  19 G  Catheter at Skin Depth (cm):  10  Depth in Epidural Space (cm):  4  Events: no blood with aspiration, no cerebrospinal fluid with aspiration, no paresthesia and negative aspiration test    Test Dose:  Bupivacaine 0.25% w/ epi and negative    Assessment:   Catheter Secured:  Tegaderm and tape  Insertion:  Uncomplicated  Patient tolerance:  Patient tolerated the procedure well with no immediate complications  Spinal portion:  A 25 g pencil point spinal needle was placed through the Touhy x1 attempt until CSF was obtained. 0.8 mL 0.25% bupivacaine with 1:200K epinephrine was deposited into the CSF. -paresthesia.

## 2020-05-24 NOTE — PROGRESS NOTES
Pt feels contractions are getting stronger and more frequent. Cat 1 tracing. Examined at 0610 hrs and Cx found to be 3-4/80/0st/IBOW. Wants epidural. Will recheck after epidural at 0800 hrs. Plan per result. Discussed possibility of Pitocin if she falls off labor curve as contractions currently Q 6 minutes.

## 2020-05-24 NOTE — ROUTINE PROCESS
Bedside and Verbal shift change report given to MARCIANO Rubio (oncoming nurse) by Jesus Yeboah RN (offgoing nurse). Report included the following information SBAR.

## 2020-05-24 NOTE — ROUTINE PROCESS
0730- Bedside report obtained from [address] [place] [address]. Pt got an epidural, no complications. [phone]- Dr Devonte Peres in assessed pt's cx at 5-6/100/0 Pt comfortable with epidural. 
0900- Pt feeling vaginal pressure with contractions. SVE done Cx at 9cms. Dr Devonte Peres notified. 5230- Dr Ehsan Cummings in to see pt. 
1005- Cx complete md notified. 200- Dr Ehsan Cummings in. Prepped for delivery. 0- Pt delivered a viable male infant. 1230- Recovery completed. Pt is eating. 1330- One hour check done. Pt still numb unable to walk to BR.  
1420- Pt assisted up to the BR, voided 800 ml baldo care and partial bath done. Returned to bed tolerated. 1600- SBAR report to Alley Knight given.

## 2020-05-24 NOTE — H&P
History & Physical    Name: Laz Paiz MRN: [id number]  SSN: [id number]    YOB: 1985  Age: 37 y.o. Sex: female        Subjective:   CC: abdominal pain  Estimated Date of Delivery: 20  OB History    Para Term  AB Living   2 1 1     1   SAB TAB Ectopic Molar Multiple Live Births           0 1      # Outcome Date GA Lbr Brian/2nd Weight Sex Delivery Anes PTL Lv   2 Current            1 Term 18 38w3d 01:47 / 00:28 3.265 kg F Garwin Goodell Ms. Georgianne Sat is admitted with pregnancy at 39w2d for c/o rhythmic , global, moderately painful,non-radiating,abdominal pain since 2000 hrs every 6 minutes. No ROM,vaginal bleeding,ROM, or decreased FM. Prenatal course was normal. Please see prenatal records for details. History reviewed. No pertinent past medical history. History reviewed. No pertinent surgical history. Social History     Occupational History    Not on file   Tobacco Use    Smoking status: Never Smoker    Smokeless tobacco: Never Used   Substance and Sexual Activity    Alcohol use: No    Drug use: No    Sexual activity: Yes     Partners: Male     Birth control/protection: None     Family History   Problem Relation Age of Onset    Psychiatric Disorder Brother     Cancer Maternal Grandmother     Alcohol abuse Paternal Grandmother     Stroke Paternal Grandmother     Cancer Paternal Grandfather     Psychiatric Disorder Paternal Grandfather      No Known Allergies  Prior to Admission medications    Medication Sig Start Date End Date Taking? Authorizing Provider   cholecalciferol, vitamin D3, (VITAMIN D3 PO) Take 1 Tab by mouth daily. Yes Provider, Historical   PNV66-Iron Fumarate-FA-DSS-DHA 26-1.2- mg cap Take 1 Tab by mouth. Indications: pregnancy   Yes Provider, Historical   ibuprofen (MOTRIN) 800 mg tablet Take 1 Tab by mouth every eight (8) hours as needed for Pain.  18   Montana Buitrago MD   oxyCODONE-acetaminophen (PERCOCET) 5-325 mg per tablet Take 1 Tab by mouth every four (4) hours as needed for Pain. Max Daily Amount: 6 Tabs. 6/7/18   Rekha Buitrago MD   miscellaneous medical supply (BLOOD PRESSURE CUFF) misc 1 Each by Does Not Apply route daily. 6/7/18   Rekha Buitrago MD   calcium carbonate (TUMS) 200 mg calcium (500 mg) chew Take 2 Tabs by mouth daily. Indications: Heartburn    Provider, Historical        Review of Systems   Constitutional: Negative for chills, diaphoresis, fatigue and fever. HENT: Negative for congestion, mouth sores, rhinorrhea and sore throat. Eyes: Negative for photophobia and visual disturbance. Respiratory: Negative for cough, shortness of breath and wheezing. Cardiovascular: Negative for chest pain, palpitations and leg swelling. Gastrointestinal: Positive for abdominal pain. Negative for blood in stool, constipation, diarrhea, nausea and vomiting. Endocrine: Negative for cold intolerance and heat intolerance. Genitourinary: Negative for dysuria, genital sores, urgency and vaginal bleeding. Musculoskeletal: Negative for arthralgias and myalgias. Skin: Negative for color change, pallor and rash. Neurological: Negative for dizziness, syncope and headaches. Hematological: Negative for adenopathy. Does not bruise/bleed easily. Psychiatric/Behavioral: Negative for agitation, behavioral problems, confusion and decreased concentration. Objective:     Vitals:  Vitals:    05/24/20 0147 05/24/20 0149 05/24/20 0159 05/24/20 0202   BP: 117/77   122/86   Pulse: 67   72   Resp:       Temp:       SpO2:  98% 98%    Weight:       Height:            Physical Exam  Vitals signs and nursing note reviewed. Exam conducted with a chaperone present. Constitutional:       General: She is not in acute distress. Appearance: Normal appearance. She is normal weight. She is not ill-appearing, toxic-appearing or diaphoretic. HENT:      Head: Normocephalic and atraumatic. Right Ear: External ear normal.      Left Ear: External ear normal.   Eyes:      General: No scleral icterus. Right eye: No discharge. Left eye: No discharge. Extraocular Movements: Extraocular movements intact. Neck:      Musculoskeletal: Normal range of motion and neck supple. No neck rigidity or muscular tenderness. Cardiovascular:      Rate and Rhythm: Normal rate and regular rhythm. Heart sounds: Normal heart sounds. No murmur. No friction rub. No gallop. Pulmonary:      Effort: Pulmonary effort is normal. No respiratory distress. Breath sounds: Normal breath sounds. No stridor. No wheezing, rhonchi or rales. Abdominal:      General: There is no distension. Palpations: Abdomen is soft. There is no mass. Tenderness: There is no abdominal tenderness. There is no right CVA tenderness, left CVA tenderness, guarding or rebound. Hernia: No hernia is present. Genitourinary:     General: Normal vulva. Vagina: No vaginal discharge. Musculoskeletal: Normal range of motion. General: No swelling, tenderness, deformity or signs of injury. Right lower leg: No edema. Left lower leg: No edema. Lymphadenopathy:      Cervical: No cervical adenopathy. Skin:     General: Skin is warm and dry. Capillary Refill: Capillary refill takes less than 2 seconds. Coloration: Skin is not jaundiced or pale. Findings: No bruising, erythema, lesion or rash. Neurological:      General: No focal deficit present. Mental Status: She is alert and oriented to person, place, and time. Deep Tendon Reflexes: Reflexes normal.   Psychiatric:         Mood and Affect: Mood normal.         Behavior: Behavior normal.         Thought Content:  Thought content normal.         Judgment: Judgment normal.         Cervical Exam: 2-3 cm dilated    70% effaced    -1 station    Presenting Part: vtx by U/S  Cervical Position: mid position  Consistency: Soft  Uterine Activity: Frequency: Every 3-6 minutes   Membranes: Intact  Fetal Heart Rate: Reactive     Prenatal Labs:   Lab Results   Component Value Date/Time    Rubella, External Immune 10/18/2019    GrBStrep, External Negative 2020    HBsAg, External Negative 10/18/2019    HIV, External Non-reactive 10/18/2019    Gonorrhea, External Negative 10/18/2019    Chlamydia, External Negative 10/18/2019    ABO,Rh B positive 10/18/2019          Impression/Plan:     1)IUP at 39+ weeks in early labor     Plan: Admit for labor. Group B Strep was negative. Labs,IVF,anticipate .

## 2020-05-24 NOTE — ANESTHESIA PROCEDURE NOTES
Peripheral Block Performed by: Mimi Tapia MD 
Authorized by: Mimi Tapia MD  
 
 
Block Type: Assessment: 
 
Injection Assessment:

## 2020-05-24 NOTE — ROUTINE PROCESS
TRANSFER - IN REPORT: 
 
Verbal report received from CONOR Nguyen RN (name) on Dex Andre  being received from L&D (unit) for routine progression of care Report consisted of patients Situation, Background, Assessment and  
Recommendations(SBAR). Information from the following report(s) SBAR was reviewed with the receiving nurse. Opportunity for questions and clarification was provided. Assessment completed upon patients arrival to unit and care assumed.

## 2020-05-24 NOTE — PROGRESS NOTES
Pt of Dr. Alycia Rooney WENDY 5/29/2020 arrives ambulatory to L&D with a report of contractions that started around 8pm last night; 10 minutes apart to 5 minutes now, reported by pt. Pt denies vaginal bleeding or LOF; reports some pinkish discharge, \"mucous plug. \" Pt reports she was 2cm in office on Tuesday. Pt reports positive fetal movement. Pt denies complications during pregnancy. 0147: Dr. Salo Lawton called and notified of pt at this time. 5427: Registration called about pt at this time. 0205: Dr. Salo Lawton at bedside to assess pt and discuss plan of care at this time. 0212: SVE by Dr. Salo Lawton; pt 2-3cm at this time. Per Dr. Salo Lawton pt may be moved to an L&D at this time. 0215: US by Dr. Salo Lawton at this time. 3691: Pt taken off efm at this time. 0335: Pt ambulatory in hallway at this time. 1541: Pt returned to bed in a position of comfort; back on efm; call bell within reach. 5170: Pt taken off efm at this time; pt resting comfortably in bed; call bell within reach. 9783: Per Dr. Salo Lawton put pt back on efm at [phone]; will come see pt at 0600.    0545: Pt ambulatory to bathroom at this time; pt voided; pt returned to bed in a position of comfort; call bell within reach; pt back on efm.    0609: Dr. Salo Lawton at bedside to assess pt and discuss plan of care at this time; SVE pt 3-4cm/80%/0.    0019: Pt wanting to prepare for epidural at this time. 0518: SROM at this time. 2054: Dr. Salo Lawton notified of SROM at this time. 8245: Bed pads changed at this time. 2271: Dr. Brandan Hsu called for epidural at this time. Pt may sit up for epidural at this time. 9817: Dr. Brandan Hsu at bedside to consent patient for epidural at this time. 0719: Time out for epidural at this time. 0086: Pt returned to efm at this time. 0730: Bedside shift report given to A. Danielle Shaper, RN.

## 2020-05-25 VITALS
HEIGHT: 65 IN | DIASTOLIC BLOOD PRESSURE: 68 MMHG | WEIGHT: 168.21 LBS | HEART RATE: 68 BPM | TEMPERATURE: 97.8 F | OXYGEN SATURATION: 97 % | SYSTOLIC BLOOD PRESSURE: 110 MMHG | RESPIRATION RATE: 18 BRPM | BODY MASS INDEX: 28.03 KG/M2

## 2020-05-25 PROCEDURE — 59025 FETAL NON-STRESS TEST: CPT

## 2020-05-25 PROCEDURE — 74011250637 HC RX REV CODE- 250/637: Performed by: OBSTETRICS & GYNECOLOGY

## 2020-05-25 PROCEDURE — 99285 EMERGENCY DEPT VISIT HI MDM: CPT

## 2020-05-25 RX ORDER — IBUPROFEN 800 MG/1
800 TABLET ORAL
Qty: 60 TAB | Refills: 0 | Status: SHIPPED
Start: 2020-05-25

## 2020-05-25 RX ADMIN — IBUPROFEN 800 MG: 400 TABLET, FILM COATED ORAL at 08:07

## 2020-05-25 NOTE — ROUTINE PROCESS
Patient discharge instructions given. Verbalized understanding. All questions answered. No distress noted. Signed copy of discharge instructions on paper chart. Pt instructed to follow up with OB in 6 weeks, sooner if needed.

## 2020-05-25 NOTE — DISCHARGE SUMMARY
Obstetrical Discharge Summary     Name: Bianca Owusu MRN: [id number]  SSN: [id number]    YOB: 1985  Age: 37 y.o. Sex: female      Admit Date: 2020    Discharge Date: 2020     Admitting Physician: Chevy Castillo MD     Attending Physician: Mary Oviedo, *     Admission Diagnoses: Irregular labor [O62.2]    Discharge Diagnoses:   Information for the patient's :  Chinedu Sanchez [[id number]]   Delivery of a 3.73 kg male infant via Vaginal, Spontaneous on 2020 at 10:28 AM  by Adelfa Dancer. Apgars were 8  and 9 . Additional Diagnoses:   Hospital Problems  Date Reviewed: 2018          Codes Class Noted POA    Irregular labor ICD-10-CM: O62.2  ICD-9-CM: 661.20  2020 Unknown             Lab Results   Component Value Date/Time    Rubella, External Immune 10/18/2019    GrBStrep, External Negative 2020       Hospital Course: Normal hospital course following the delivery. Disposition at Discharge: Home or self care    Discharged Condition: Stable    Patient Instructions:   Current Discharge Medication List      CONTINUE these medications which have CHANGED    Details   ibuprofen (MOTRIN) 800 mg tablet Take 1 Tab by mouth every eight (8) hours as needed for Pain. Qty: 60 Tab, Refills: 0         CONTINUE these medications which have NOT CHANGED    Details   cholecalciferol, vitamin D3, (VITAMIN D3 PO) Take 1 Tab by mouth daily. PNV66-Iron Fumarate-FA-DSS-DHA 26-1.2- mg cap Take 1 Tab by mouth. Indications: pregnancy      miscellaneous medical supply (BLOOD PRESSURE CUFF) misc 1 Each by Does Not Apply route daily. Qty: 1 Each, Refills: 0      calcium carbonate (TUMS) 200 mg calcium (500 mg) chew Take 2 Tabs by mouth daily.  Indications: Heartburn         STOP taking these medications       oxyCODONE-acetaminophen (PERCOCET) 5-325 mg per tablet Comments:   Reason for Stopping:               Reference my discharge instructions.     Follow-up Appointments   Procedures    FOLLOW UP VISIT Appointment in: 6 Weeks     Standing Status:   Standing     Number of Occurrences:   1     Order Specific Question:   Appointment in     Answer:   6 Weeks        Signed By:  Ny Donnelly MD     May 25, 2020

## 2020-05-25 NOTE — DISCHARGE INSTRUCTIONS
Vaginal Childbirth: What To Expect At Home    Your Recovery: Your body will slowly heal in the next few weeks. It is easy to get too tired and overwhelmed during the first weeks after your baby is born. Changes in your hormones can shift your mood without warning. You may find it hard to meet the extra demands on your energy and time. Take it easy on yourself. Follow-up care is a key part of your treatment and safety. Be sure to make and go to all appointments, and call your doctor if you are having problems. It's also a good idea to know your test results and keep a list of the medicines you take. How can you care for yourself at home? Vaginal bleeding and cramps  · After delivery, you will have a bloody discharge from the vagina. This will turn pink within a week and then white or yellow after about 10 days. It may last for 2 to 4 weeks or longer, until the uterus has healed. Use pads instead of tampons until you stop bleeding. · Do not worry if you pass some blood clots, as long as they are smaller than a golf ball. If you have a tear or stitches in your vaginal area, change the pad at least every 4 hours to prevent soreness and infection. · You may have cramps for the first few days after childbirth. These are normal and occur as the uterus shrinks to normal size. Take an over-the-counter pain medicine, such as acetaminophen (Tylenol), ibuprofen (Advil, Motrin), or naproxen (Aleve), for cramps. Read and follow all instructions on the label. Do not take aspirin, because it can cause more bleeding. Do not take acetaminophen (Tylenol) and other acetaminophen containing medications (i.e. Percocet) at the same time. Breast fullness  · Your breasts may overfill (engorge) in the first few days after delivery. To help milk flow and to relieve pain, warm your breasts in the shower or by using warm, moist towels before nursing.   · If you are not nursing, do not put warmth on your breasts or touch your breasts. Wear a tight bra or sports bra and use ice until the fullness goes away. This usually takes 2 to 3 days. · Put ice or a cold pack on your breast after nursing to reduce swelling and pain. Put a thin cloth between the ice and your skin. Activity  · Eat a balanced diet. Do not try to lose weight by cutting calories. Keep taking your prenatal vitamins, or take a multivitamin. · Get as much rest as you can. Try to take naps when your baby sleeps during the day. · Get some exercise every day. But do not do any heavy exercise until your doctor says it is okay. · Wait until you are healed (about 4 to 6 weeks) before you have sexual intercourse. Your doctor will tell you when it is okay to have sex. · Talk to your doctor about birth control. You can get pregnant even before your period returns. Also, you can get pregnant while you are breast-feeding. Mental Health  · Many women get the \"baby blues\" during the first few days after childbirth. You may lose sleep, feel irritable, and cry easily. You may feel happy one minute and sad the next. Hormone changes are one cause of these emotional changes. Also, the demands of a new baby, along with visits from relatives or other family needs, add to a mother's stress. The \"baby blues\" often peak around the fourth day. Then they ease up in less than 2 weeks. · If your moodiness or anxiety lasts for more than 2 weeks, or if you feel like life is not worth living, you may have postpartum depression. This is different for each mother. Some mothers with serious depression may worry intensely about their infant's well-being. Others may feel distant from their child. Some mothers might even feel that they might harm their baby. A mother may have signs of paranoia, wondering if someone is watching her. · With all the changes in your life, you may not know if you are depressed.  Pregnancy sometimes causes changes in how you feel that are similar to the symptoms of depression. · Symptoms of depression include:  · Feeling sad or hopeless and losing interest in daily activities. These are the most common symptoms of depression. · Sleeping too much or not enough. · Feeling tired. You may feel as if you have no energy. · Eating too much or too little. · [employer] ([employer]) offers a Warm line; Chat with the Expert phone sessions; Information and Articles about Pregnancy and Postpartum Mood Disorders; Comprehensive List of Free Support Groups; Knowledgeable local coordinators who will offer support, information, and resources; Guide to Resources on [employer]; Calendar of events in the  mood disorders community; Latest News and Research; and [facility] [address] for [employer]. Remember - You are not alone; You are not to blame; With help, you will be well. 0-734-285-PPD(3373). WWW. [employer]. NET   · Writing or talking about death, such as writing suicide notes or talking about guns, knives, or pills. Keep the numbers for these national suicide hotlines: 7-955-050-TALK (0-936.893.1185) and 2-728-IDXRGDR (1-949.762.3785). If you or someone you know talks about suicide or feeling hopeless, get help right away. Constipation and Hemorrhoids  · Drink plenty of fluids, enough so that your urine is light yellow or clear like water. If you have kidney, heart, or liver disease and have to limit fluids, talk with your doctor before you increase the amount of fluids you drink. · Eat plenty of fiber each day. Have a bran muffin or bran cereal for breakfast, and try eating a piece of fruit for a mid-afternoon snack. · For painful, itchy hemorrhoids, put ice or a cold pack on the area several times a day for 10 minutes at a time. Follow this by putting a warm compress on the area for another 10 to 20 minutes or by sitting in a shallow, warm bath. When should you call for help? Call 911 anytime you think you may need emergency care.  For example, call if:  · You are thinking of hurting yourself, your baby, or anyone else. · You passed out (lost consciousness). · You have symptoms of a blood clot in your lung (called a pulmonary embolism). These may include:    · Sudden chest pain. · Trouble breathing. · Coughing up blood. Call your doctor now or seek immediate medical care if:  · You have severe vaginal bleeding. · You are soaking through a pad each hour for 2 or more hours. · Your vaginal bleeding seems to be getting heavier or is still bright red 4 days after delivery. · You are dizzy or lightheaded, or you feel like you may faint. · You are vomiting or cannot keep fluids down. · You have a fever. · You have new or more belly pain. · You pass tissue (not just blood). · Your vaginal discharge smells bad. · Your belly feels tender or full and hard. · Your breasts are continuously painful or red. · You feel sad, anxious, or hopeless for more than a few days. · You have sudden, severe pain in your belly. · You have symptoms of a blood clot in your leg (called a deep vein thrombosis),          such as:  · Pain in your calf, back of the knee, thigh, or groin. · Redness and swelling in your leg or groin. · You have symptoms of preeclampsia, such as:  · Sudden swelling of your face, hands, or feet. · New vision problems (such as dimness or blurring). · A severe headache. · Your blood pressure is higher than it should be or rises suddenly. · You have new nausea or vomiting. Watch closely for changes in your health, and be sure to contact your doctor if you have any problems.

## 2020-05-25 NOTE — PROGRESS NOTES
Post-Partum Day Number 1 Progress Note    Kirt Hackett     Assessment: Doing well, post partum day 1 s/p  p labor at 39w2d     Plan:   1. Discharge home today  2. Follow up in office in 6 weeks with Karla Buitrago, *  3. Post partum activity advised, diet as tolerated  4. Discharge Medications: ibuprofen, colace, and medications prior to admission    Information for the patient's :  Cesar Nugent [[id number]]   Vaginal, Spontaneous   Patient doing well without significant complaint. Voiding without difficulty, normal lochia. Vitals:  Visit Vitals  /68 (BP 1 Location: Right arm, BP Patient Position: At rest)   Pulse 68   Temp 97.8 °F (36.6 °C)   Resp 18   Ht 5' 5\" (1.651 m)   Wt 76.3 kg (168 lb 3.4 oz)   SpO2 97%   Breastfeeding Yes   BMI 27.99 kg/m²     Temp (24hrs), Av.1 °F (36.7 °C), Min:97.6 °F (36.4 °C), Max:98.4 °F (36.9 °C)      Exam:         Patient without distress. Abdomen soft, fundus firm, nontender                 Lower extremities are negative for swelling, cords or tenderness. Labs:     Lab Results   Component Value Date/Time    WBC 11.6 (H) 2020 02:42 AM    WBC 11.2 (H) 2018 02:31 AM    HGB 13.1 2020 02:42 AM    HGB 12.7 2018 02:31 AM    HCT 39.0 2020 02:42 AM    HCT 37.3 2018 02:31 AM    PLATELET 458  02:42 AM    PLATELET 744  02:31 AM       No results found for this or any previous visit (from the past 24 hour(s)).